# Patient Record
Sex: FEMALE | Race: WHITE | NOT HISPANIC OR LATINO | Employment: OTHER | URBAN - METROPOLITAN AREA
[De-identification: names, ages, dates, MRNs, and addresses within clinical notes are randomized per-mention and may not be internally consistent; named-entity substitution may affect disease eponyms.]

---

## 2023-05-09 ENCOUNTER — TELEPHONE (OUTPATIENT)
Dept: PRIMARY CARE | Facility: CLINIC | Age: 83
End: 2023-05-09
Payer: MEDICARE

## 2023-05-09 DIAGNOSIS — K21.9 GASTROESOPHAGEAL REFLUX DISEASE, UNSPECIFIED WHETHER ESOPHAGITIS PRESENT: ICD-10-CM

## 2023-05-09 RX ORDER — OMEPRAZOLE 20 MG/1
20 CAPSULE, DELAYED RELEASE ORAL
Qty: 90 CAPSULE | Refills: 3 | Status: SHIPPED | OUTPATIENT
Start: 2023-05-09 | End: 2024-05-21 | Stop reason: SDUPTHER

## 2023-05-09 RX ORDER — OMEPRAZOLE 20 MG/1
CAPSULE, DELAYED RELEASE ORAL
COMMUNITY
End: 2023-05-09 | Stop reason: SDUPTHER

## 2023-05-09 NOTE — TELEPHONE ENCOUNTER
Rx Refill Request Telephone Encounter    Name:  Olga Denis  :  [id number]  Medication Name:  Omeprazole 20MG Oral Capsule Delayed Release     Specific Pharmacy location:  [address]   Date of last appointment:  1/10/23  Date of next appointment: 23   Best number to reach patient: [phone]

## 2023-05-16 ENCOUNTER — TELEPHONE (OUTPATIENT)
Dept: PRIMARY CARE | Facility: CLINIC | Age: 83
End: 2023-05-16
Payer: MEDICARE

## 2023-05-16 DIAGNOSIS — E78.2 MIXED HYPERLIPIDEMIA: Primary | ICD-10-CM

## 2023-05-16 RX ORDER — ATORVASTATIN CALCIUM 40 MG/1
40 TABLET, FILM COATED ORAL
COMMUNITY
Start: 2021-12-22 | End: 2023-05-16 | Stop reason: SDUPTHER

## 2023-05-16 RX ORDER — ATORVASTATIN CALCIUM 40 MG/1
40 TABLET, FILM COATED ORAL
Qty: 45 TABLET | Refills: 3 | Status: SHIPPED | OUTPATIENT
Start: 2023-05-16 | End: 2023-07-11 | Stop reason: SDUPTHER

## 2023-05-16 NOTE — TELEPHONE ENCOUNTER
Rx Refill Request Telephone Encounter    Name:  Olga Denis  :  [id number]  Medication Name:        Atorvastatin Calcium 40 MG Oral Tablet 1 .5 tablet daily                  Specific Pharmacy location:  [facility] in [place]    Date of last appointment:  01/10/2023  Date of next appointment:  2023  Best number to reach patient:  [phone]

## 2023-07-10 PROBLEM — B35.1 TOENAIL FUNGUS: Status: ACTIVE | Noted: 2023-07-10

## 2023-07-10 PROBLEM — N95.2 VAGINAL ATROPHY: Status: ACTIVE | Noted: 2023-07-10

## 2023-07-10 PROBLEM — E04.2 MULTINODULAR GOITER: Status: ACTIVE | Noted: 2023-07-10

## 2023-07-10 PROBLEM — B35.1 ONYCHOMYCOSIS: Status: ACTIVE | Noted: 2023-07-10

## 2023-07-10 PROBLEM — M79.89 SWELLING OF RIGHT FOOT: Status: ACTIVE | Noted: 2023-07-10

## 2023-07-10 PROBLEM — M79.89 LEG SWELLING: Status: ACTIVE | Noted: 2023-07-10

## 2023-07-10 PROBLEM — Z85.820 HISTORY OF MELANOMA: Status: ACTIVE | Noted: 2023-07-10

## 2023-07-10 PROBLEM — G89.29 CHRONIC RIGHT SHOULDER PAIN: Status: ACTIVE | Noted: 2023-07-10

## 2023-07-10 PROBLEM — G43.109 OCULAR MIGRAINE: Status: ACTIVE | Noted: 2023-07-10

## 2023-07-10 PROBLEM — E11.65 TYPE 2 DIABETES MELLITUS WITH HYPERGLYCEMIA, WITHOUT LONG-TERM CURRENT USE OF INSULIN (MULTI): Status: ACTIVE | Noted: 2023-07-10

## 2023-07-10 PROBLEM — M25.511 CHRONIC RIGHT SHOULDER PAIN: Status: ACTIVE | Noted: 2023-07-10

## 2023-07-10 PROBLEM — R93.1 AGATSTON CAC SCORE, <100: Status: ACTIVE | Noted: 2023-07-10

## 2023-07-10 PROBLEM — I10 ESSENTIAL HYPERTENSION: Status: ACTIVE | Noted: 2023-07-10

## 2023-07-10 PROBLEM — J30.9 ALLERGIC RHINITIS: Status: ACTIVE | Noted: 2023-07-10

## 2023-07-10 PROBLEM — M85.80 OSTEOPENIA: Status: ACTIVE | Noted: 2023-07-10

## 2023-07-10 LAB
ALANINE AMINOTRANSFERASE (SGPT) (U/L) IN SER/PLAS: 17 U/L (ref 7–45)
ALBUMIN (G/DL) IN SER/PLAS: 3.8 G/DL (ref 3.4–5)
ALKALINE PHOSPHATASE (U/L) IN SER/PLAS: 56 U/L (ref 33–136)
ANION GAP IN SER/PLAS: 11 MMOL/L (ref 10–20)
ASPARTATE AMINOTRANSFERASE (SGOT) (U/L) IN SER/PLAS: 15 U/L (ref 9–39)
BILIRUBIN TOTAL (MG/DL) IN SER/PLAS: 0.6 MG/DL (ref 0–1.2)
CALCIDIOL (25 OH VITAMIN D3) (NG/ML) IN SER/PLAS: 45 NG/ML
CALCIUM (MG/DL) IN SER/PLAS: 9.5 MG/DL (ref 8.6–10.3)
CARBON DIOXIDE, TOTAL (MMOL/L) IN SER/PLAS: 30 MMOL/L (ref 21–32)
CHLORIDE (MMOL/L) IN SER/PLAS: 104 MMOL/L (ref 98–107)
CHOLESTEROL (MG/DL) IN SER/PLAS: 147 MG/DL (ref 0–199)
CHOLESTEROL IN HDL (MG/DL) IN SER/PLAS: 45.1 MG/DL
CHOLESTEROL IN LDL (MG/DL) IN SER/PLAS BY DIRECT ASSAY: 83 MG/DL (ref 0–129)
CHOLESTEROL/HDL RATIO: 3.3
COBALAMIN (VITAMIN B12) (PG/ML) IN SER/PLAS: 782 PG/ML (ref 211–911)
CREATININE (MG/DL) IN SER/PLAS: 0.78 MG/DL (ref 0.5–1.05)
ERYTHROCYTE DISTRIBUTION WIDTH (RATIO) BY AUTOMATED COUNT: 13.3 % (ref 11.5–14.5)
ERYTHROCYTE MEAN CORPUSCULAR HEMOGLOBIN CONCENTRATION (G/DL) BY AUTOMATED: 32.8 G/DL (ref 32–36)
ERYTHROCYTE MEAN CORPUSCULAR VOLUME (FL) BY AUTOMATED COUNT: 91 FL (ref 80–100)
ERYTHROCYTES (10*6/UL) IN BLOOD BY AUTOMATED COUNT: 4.54 X10E12/L (ref 4–5.2)
ESTIMATED AVERAGE GLUCOSE FOR HBA1C: 148 MG/DL
GFR FEMALE: 76 ML/MIN/1.73M2
GLUCOSE (MG/DL) IN SER/PLAS: 129 MG/DL (ref 74–99)
HEMATOCRIT (%) IN BLOOD BY AUTOMATED COUNT: 41.5 % (ref 36–46)
HEMOGLOBIN (G/DL) IN BLOOD: 13.6 G/DL (ref 12–16)
HEMOGLOBIN A1C/HEMOGLOBIN TOTAL IN BLOOD: 6.8 %
LDL: 77 MG/DL (ref 0–99)
LEUKOCYTES (10*3/UL) IN BLOOD BY AUTOMATED COUNT: 7.5 X10E9/L (ref 4.4–11.3)
PLATELETS (10*3/UL) IN BLOOD AUTOMATED COUNT: 247 X10E9/L (ref 150–450)
POTASSIUM (MMOL/L) IN SER/PLAS: 4.4 MMOL/L (ref 3.5–5.3)
PROTEIN TOTAL: 5.6 G/DL (ref 6.4–8.2)
SODIUM (MMOL/L) IN SER/PLAS: 141 MMOL/L (ref 136–145)
THYROTROPIN (MIU/L) IN SER/PLAS BY DETECTION LIMIT <= 0.05 MIU/L: 1.2 MIU/L (ref 0.44–3.98)
TRIGLYCERIDE (MG/DL) IN SER/PLAS: 125 MG/DL (ref 0–149)
UREA NITROGEN (MG/DL) IN SER/PLAS: 20 MG/DL (ref 6–23)
VLDL: 25 MG/DL (ref 0–40)

## 2023-07-10 RX ORDER — METOPROLOL SUCCINATE 50 MG/1
1 TABLET, EXTENDED RELEASE ORAL DAILY
COMMUNITY
End: 2023-09-01 | Stop reason: SDUPTHER

## 2023-07-10 RX ORDER — METFORMIN HYDROCHLORIDE 500 MG/1
1 TABLET ORAL 2 TIMES DAILY
COMMUNITY
Start: 2021-02-11 | End: 2024-01-15 | Stop reason: SDUPTHER

## 2023-07-10 RX ORDER — HYDROCHLOROTHIAZIDE 25 MG/1
1 TABLET ORAL DAILY
COMMUNITY
End: 2024-02-02 | Stop reason: SDUPTHER

## 2023-07-10 RX ORDER — LOSARTAN POTASSIUM 100 MG/1
1 TABLET ORAL DAILY
COMMUNITY
End: 2024-03-14 | Stop reason: SDUPTHER

## 2023-07-11 ENCOUNTER — OFFICE VISIT (OUTPATIENT)
Dept: PRIMARY CARE | Facility: CLINIC | Age: 83
End: 2023-07-11
Payer: MEDICARE

## 2023-07-11 VITALS
DIASTOLIC BLOOD PRESSURE: 82 MMHG | SYSTOLIC BLOOD PRESSURE: 148 MMHG | WEIGHT: 144.4 LBS | HEART RATE: 51 BPM | TEMPERATURE: 97.5 F | BODY MASS INDEX: 24.06 KG/M2 | RESPIRATION RATE: 16 BRPM | HEIGHT: 65 IN | OXYGEN SATURATION: 96 %

## 2023-07-11 DIAGNOSIS — I10 ESSENTIAL HYPERTENSION: ICD-10-CM

## 2023-07-11 DIAGNOSIS — E78.2 MIXED HYPERLIPIDEMIA: ICD-10-CM

## 2023-07-11 DIAGNOSIS — Z00.00 ROUTINE GENERAL MEDICAL EXAMINATION AT HEALTH CARE FACILITY: Primary | ICD-10-CM

## 2023-07-11 DIAGNOSIS — E11.65 TYPE 2 DIABETES MELLITUS WITH HYPERGLYCEMIA, WITHOUT LONG-TERM CURRENT USE OF INSULIN (MULTI): ICD-10-CM

## 2023-07-11 PROCEDURE — 1159F MED LIST DOCD IN RCRD: CPT | Performed by: NURSE PRACTITIONER

## 2023-07-11 PROCEDURE — G0439 PPPS, SUBSEQ VISIT: HCPCS | Performed by: NURSE PRACTITIONER

## 2023-07-11 PROCEDURE — 90471 IMMUNIZATION ADMIN: CPT | Performed by: NURSE PRACTITIONER

## 2023-07-11 PROCEDURE — 3077F SYST BP >= 140 MM HG: CPT | Performed by: NURSE PRACTITIONER

## 2023-07-11 PROCEDURE — 90715 TDAP VACCINE 7 YRS/> IM: CPT | Performed by: NURSE PRACTITIONER

## 2023-07-11 PROCEDURE — 1170F FXNL STATUS ASSESSED: CPT | Performed by: NURSE PRACTITIONER

## 2023-07-11 PROCEDURE — 3079F DIAST BP 80-89 MM HG: CPT | Performed by: NURSE PRACTITIONER

## 2023-07-11 PROCEDURE — 1036F TOBACCO NON-USER: CPT | Performed by: NURSE PRACTITIONER

## 2023-07-11 PROCEDURE — 1126F AMNT PAIN NOTED NONE PRSNT: CPT | Performed by: NURSE PRACTITIONER

## 2023-07-11 RX ORDER — ATORVASTATIN CALCIUM 40 MG/1
40 TABLET, FILM COATED ORAL
Qty: 90 TABLET | Refills: 1 | Status: SHIPPED | OUTPATIENT
Start: 2023-07-11 | End: 2023-09-01 | Stop reason: SDUPTHER

## 2023-07-11 ASSESSMENT — PATIENT HEALTH QUESTIONNAIRE - PHQ9
1. LITTLE INTEREST OR PLEASURE IN DOING THINGS: NOT AT ALL
2. FEELING DOWN, DEPRESSED OR HOPELESS: NOT AT ALL
SUM OF ALL RESPONSES TO PHQ9 QUESTIONS 1 AND 2: 0

## 2023-07-11 ASSESSMENT — ENCOUNTER SYMPTOMS
CHILLS: 0
SORE THROAT: 0
NERVOUS/ANXIOUS: 0
CONFUSION: 0
COUGH: 0
FEVER: 0
OCCASIONAL FEELINGS OF UNSTEADINESS: 0
VOMITING: 0
PALPITATIONS: 0
WEAKNESS: 0
LOSS OF SENSATION IN FEET: 1
MYALGIAS: 0
HEADACHES: 0
SLEEP DISTURBANCE: 0
SHORTNESS OF BREATH: 0
ABDOMINAL PAIN: 0
ACTIVITY CHANGE: 0
CONSTITUTIONAL NEGATIVE: 1
DIZZINESS: 0
SPEECH DIFFICULTY: 0
NAUSEA: 0
APNEA: 0
ARTHRALGIAS: 0
DEPRESSION: 0

## 2023-07-11 ASSESSMENT — ACTIVITIES OF DAILY LIVING (ADL)
BATHING: INDEPENDENT
GROCERY_SHOPPING: INDEPENDENT
DOING_HOUSEWORK: INDEPENDENT
DRESSING: INDEPENDENT
MANAGING_FINANCES: INDEPENDENT
TAKING_MEDICATION: INDEPENDENT

## 2023-07-11 NOTE — PROGRESS NOTES
"Subjective   Reason for Visit: Olga Denis is an 82 y.o. female here for a Medicare Wellness visit.     Past Medical, Surgical, and Family History reviewed and updated in chart.    Reviewed all medications by prescribing practitioner or clinical pharmacist (such as prescriptions, OTCs, herbal therapies and supplements) and documented in the medical record.    Lab review     .Lab Results       Component                Value               Date                       CHOL                     147                 07/10/2023                 CHOL                     130                 01/04/2023                 CHOL                     120                 05/20/2022            Lab Results       Component                Value               Date                       HDL                      45.1                07/10/2023                 HDL                      41.2                01/04/2023                 HDL                      40.7                05/20/2022            No results found for: \"LDLCALC\"  Lab Results       Component                Value               Date                       TRIG                     125                 07/10/2023                 TRIG                     99                  01/04/2023                 TRIG                     81                  05/20/2022            No components found for: \"CHOLHDL\"     .  Chemistry   Lab Results       Component                Value               Date/Time                  NA                       141                 07/10/2023 0711            K                        4.4                 07/10/2023 0711            CL                       104                 07/10/2023 0711            CO2                      30                  07/10/2023 0711            BUN                      20                  07/10/2023 0711            CREATININE               0.78                07/10/2023 0711      Lab Results       Component                Value        " "       Date/Time                  CALCIUM                  9.5                 07/10/2023 0711            ALKPHOS                  56                  07/10/2023 0711            AST                      15                  07/10/2023 0711            ALT                      17                  07/10/2023 0711            BILITOT                  0.6                 07/10/2023 0711          .Lab Results       Component                Value               Date                       HGBA1C                   6.8 (A)             07/10/2023                   Patient Care Team:  Mamie Mane MD as PCP - General  LISANDRO Barrientos as PCP - [facility]     Review of Systems   Constitutional: Negative.  Negative for activity change, chills and fever.   HENT:  Negative for congestion, postnasal drip, sneezing and sore throat.    Respiratory:  Negative for apnea, cough and shortness of breath.    Cardiovascular:  Negative for chest pain and palpitations.   Gastrointestinal:  Negative for abdominal pain, nausea and vomiting.   Musculoskeletal:  Negative for arthralgias and myalgias.   Neurological:  Negative for dizziness, syncope, speech difficulty, weakness and headaches.   Psychiatric/Behavioral:  Negative for confusion and sleep disturbance. The patient is not nervous/anxious.        Objective   Vitals:  /82   Pulse 51   Temp 36.4 °C (97.5 °F)   Resp 16   Ht 1.651 m (5' 5\")   Wt 65.5 kg (144 lb 6.4 oz)   SpO2 96%   BMI 24.03 kg/m²       Physical Exam  Vitals reviewed.   Constitutional:       Appearance: Normal appearance.   HENT:      Head: Normocephalic.   Eyes:      Conjunctiva/sclera: Conjunctivae normal.   Cardiovascular:      Rate and Rhythm: Normal rate and regular rhythm.      Pulses: Normal pulses.      Heart sounds: Normal heart sounds.   Pulmonary:      Effort: Pulmonary effort is normal.      Breath sounds: Normal breath sounds. No wheezing.   Abdominal:      General: Bowel " sounds are normal. There is no distension.   Skin:     General: Skin is warm and dry.   Neurological:      General: No focal deficit present.      Mental Status: She is alert and oriented to person, place, and time.   Psychiatric:         Mood and Affect: Mood normal.         Behavior: Behavior normal.         Assessment/Plan   Problem List Items Addressed This Visit       Mixed hyperlipidemia    Current Assessment & Plan     LDL at goal   Follow up 6 months with labs         Relevant Medications    atorvastatin (Lipitor) 40 mg tablet    Other Relevant Orders    CBC    Comprehensive Metabolic Panel    Hemoglobin A1C    Lipid Panel    TSH with reflex to Free T4 if abnormal    Vitamin D 1,25 Dihydroxy    Vitamin B12    Essential hypertension    Relevant Orders    CBC    Comprehensive Metabolic Panel    Hemoglobin A1C    Lipid Panel    TSH with reflex to Free T4 if abnormal    Vitamin D 1,25 Dihydroxy    Vitamin B12    Type 2 diabetes mellitus with hyperglycemia, without long-term current use of insulin (CMS/[facility])    Current Assessment & Plan     .  Lab Results   Component Value Date    HGBA1C 6.8 (A) 07/10/2023    Well controlled, continue current poc   Follow up 6 months          Relevant Orders    CBC    Comprehensive Metabolic Panel    Hemoglobin A1C    Lipid Panel    TSH with reflex to Free T4 if abnormal    Vitamin D 1,25 Dihydroxy    Vitamin B12    Routine general medical examination at health care facility - Primary    Relevant Orders    CBC    Comprehensive Metabolic Panel    Hemoglobin A1C    Lipid Panel    TSH with reflex to Free T4 if abnormal    Vitamin D 1,25 Dihydroxy    Vitamin B12

## 2023-07-11 NOTE — ASSESSMENT & PLAN NOTE
.  Lab Results   Component Value Date    HGBA1C 6.8 (A) 07/10/2023    Well controlled, continue current poc   Follow up 6 months

## 2023-09-01 ENCOUNTER — OFFICE VISIT (OUTPATIENT)
Dept: PRIMARY CARE | Facility: CLINIC | Age: 83
End: 2023-09-01
Payer: MEDICARE

## 2023-09-01 VITALS
OXYGEN SATURATION: 98 % | RESPIRATION RATE: 16 BRPM | HEART RATE: 54 BPM | SYSTOLIC BLOOD PRESSURE: 146 MMHG | DIASTOLIC BLOOD PRESSURE: 76 MMHG | TEMPERATURE: 97.6 F | HEIGHT: 65 IN | WEIGHT: 147.2 LBS | BODY MASS INDEX: 24.53 KG/M2

## 2023-09-01 DIAGNOSIS — R00.1 SINUS BRADYCARDIA: ICD-10-CM

## 2023-09-01 DIAGNOSIS — E78.2 MIXED HYPERLIPIDEMIA: ICD-10-CM

## 2023-09-01 DIAGNOSIS — I10 ESSENTIAL HYPERTENSION: Primary | ICD-10-CM

## 2023-09-01 DIAGNOSIS — R07.9 CHEST PAIN, UNSPECIFIED TYPE: ICD-10-CM

## 2023-09-01 PROCEDURE — 1159F MED LIST DOCD IN RCRD: CPT | Performed by: NURSE PRACTITIONER

## 2023-09-01 PROCEDURE — 1126F AMNT PAIN NOTED NONE PRSNT: CPT | Performed by: NURSE PRACTITIONER

## 2023-09-01 PROCEDURE — 99214 OFFICE O/P EST MOD 30 MIN: CPT | Performed by: NURSE PRACTITIONER

## 2023-09-01 PROCEDURE — 3077F SYST BP >= 140 MM HG: CPT | Performed by: NURSE PRACTITIONER

## 2023-09-01 PROCEDURE — 93000 ELECTROCARDIOGRAM COMPLETE: CPT | Performed by: NURSE PRACTITIONER

## 2023-09-01 PROCEDURE — 1036F TOBACCO NON-USER: CPT | Performed by: NURSE PRACTITIONER

## 2023-09-01 PROCEDURE — 3078F DIAST BP <80 MM HG: CPT | Performed by: NURSE PRACTITIONER

## 2023-09-01 RX ORDER — METOPROLOL SUCCINATE 25 MG/1
50 TABLET, EXTENDED RELEASE ORAL DAILY
Qty: 180 TABLET | Refills: 1 | Status: SHIPPED | OUTPATIENT
Start: 2023-09-01 | End: 2023-10-10 | Stop reason: SDUPTHER

## 2023-09-01 RX ORDER — ATORVASTATIN CALCIUM 40 MG/1
60 TABLET, FILM COATED ORAL
Qty: 90 TABLET | Refills: 1 | Status: SHIPPED | OUTPATIENT
Start: 2023-09-01 | End: 2024-01-29 | Stop reason: SDUPTHER

## 2023-09-01 ASSESSMENT — ENCOUNTER SYMPTOMS
NAUSEA: 0
ACTIVITY CHANGE: 0
WEAKNESS: 0
NERVOUS/ANXIOUS: 0
MYALGIAS: 0
FEVER: 0
SORE THROAT: 0
COUGH: 0
DIZZINESS: 0
PALPITATIONS: 0
ABDOMINAL PAIN: 0
CHILLS: 0
ARTHRALGIAS: 0
SPEECH DIFFICULTY: 0
APNEA: 0
CONSTITUTIONAL NEGATIVE: 1
CONFUSION: 0
VOMITING: 0
SHORTNESS OF BREATH: 0
HEADACHES: 0
HYPERTENSION: 1
SLEEP DISTURBANCE: 0

## 2023-09-01 NOTE — ASSESSMENT & PLAN NOTE
"Continue current plan of care, atorvastatin at 60 mg daily.  Corrected in patient's records to reflect correct dose.  Patient has consistently taken 1-1/2 tabs to equal 60 mg but apparently the recording of this was incorrect at 40 mg daily.  Continue low saturated and low trans fat diet  Lab Results   Component Value Date    CHOL 147 07/10/2023    CHOL 130 01/04/2023    CHOL 120 05/20/2022     Lab Results   Component Value Date    HDL 45.1 07/10/2023    HDL 41.2 01/04/2023    HDL 40.7 05/20/2022     No results found for: \"LDLCALC\"  Lab Results   Component Value Date    TRIG 125 07/10/2023    TRIG 99 01/04/2023    TRIG 81 05/20/2022     No components found for: \"CHOLHDL\"   "

## 2023-09-01 NOTE — ASSESSMENT & PLAN NOTE
Decrease metoprolol to 25 mg daily  Cardiac stress test due to risk factors  Follow-up 1 month  Call for worsening or symptoms.  ED for severe

## 2023-09-01 NOTE — PROGRESS NOTES
Subjective   Patient ID: Olga Denis is a 82 y.o. female who presents for Hypertension.    Hypertension: Patient seen on 7/11/2023. At that time BP was elevated 148/82  Patient on losartan, hydrochlorothiazide and metoprolol    Chest Pain: Midline, for about 1 month  Denies sob, palpitations   Patient with risk factors of HTN, DM and age 82.   She thinks the discomfort is a hiatal hernia however.       Last Calcium Scoring 2021,    FINDINGS:  The score and distribution of calcium in the coronary arteries is as  follows:     LM 29  LAD 2  LCx 2  RCA 53     Total 86     The visualized mid/lower ascending thoracic aorta measures 3.8 cm in  diameter. The heart is normal in size. No pericardial effusion is  present.     No gross evidence of mediastinal or hilar lymphadenopathy or masses  is identified. The visualized segments of the lungs are  hyperinflated.  An area of nodularity in the right middle lobe may  represent an area of scarring measuring 9 mm. Consider short-term  follow-up.     The visualized subdiaphragmatic structures appear intact.  FINDINGS:  The score and distribution of calcium in the coronary arteries is as  follows:     LM 29  LAD 2  LCx 2  RCA 53     Total 86     The visualized mid/lower ascending thoracic aorta measures 3.8 cm in  diameter. The heart is normal in size. No pericardial effusion is  present.     No gross evidence of mediastinal or hilar lymphadenopathy or masses  is identified. The visualized segments of the lungs are  hyperinflated.  An area of nodularity in the right middle lobe may  represent an area of scarring measuring 9 mm. Consider short-term  follow-up.     The visualized subdiaphragmatic structures appear intact.      Hypertension  Associated symptoms include chest pain. Pertinent negatives include no headaches, palpitations or shortness of breath.        Review of Systems   Constitutional: Negative.  Negative for activity change, chills and fever.   HENT:   "Negative for congestion, postnasal drip, sneezing and sore throat.    Respiratory:  Negative for apnea, cough and shortness of breath.    Cardiovascular:  Positive for chest pain. Negative for palpitations.   Gastrointestinal:  Negative for abdominal pain, nausea and vomiting.   Musculoskeletal:  Negative for arthralgias and myalgias.   Neurological:  Negative for dizziness, syncope, speech difficulty, weakness and headaches.   Psychiatric/Behavioral:  Negative for confusion and sleep disturbance. The patient is not nervous/anxious.        Objective   /76   Pulse 54   Temp 36.4 °C (97.6 °F) (Temporal)   Resp 16   Ht 1.651 m (5' 5\")   Wt 66.8 kg (147 lb 3.2 oz)   SpO2 98%   BMI 24.50 kg/m²     Physical Exam  Vitals reviewed.   Constitutional:       Appearance: Normal appearance.   HENT:      Head: Normocephalic.   Eyes:      Conjunctiva/sclera: Conjunctivae normal.   Cardiovascular:      Rate and Rhythm: Normal rate and regular rhythm.      Pulses: Normal pulses.      Heart sounds: Normal heart sounds.   Pulmonary:      Effort: Pulmonary effort is normal.      Breath sounds: Normal breath sounds. No wheezing.   Abdominal:      General: Bowel sounds are normal. There is no distension.   Skin:     General: Skin is warm and dry.   Neurological:      General: No focal deficit present.      Mental Status: She is alert and oriented to person, place, and time.   Psychiatric:         Mood and Affect: Mood normal.         Behavior: Behavior normal.         Assessment/Plan   Problem List Items Addressed This Visit       Mixed hyperlipidemia     Continue current plan of care, atorvastatin at 60 mg daily.  Corrected in patient's records to reflect correct dose.  Patient has consistently taken 1-1/2 tabs to equal 60 mg but apparently the recording of this was incorrect at 40 mg daily.  Continue low saturated and low trans fat diet  Lab Results   Component Value Date    CHOL 147 07/10/2023    CHOL 130 01/04/2023    " "CHOL 120 05/20/2022     Lab Results   Component Value Date    HDL 45.1 07/10/2023    HDL 41.2 01/04/2023    HDL 40.7 05/20/2022   No results found for: \"LDLCALC\"  Lab Results   Component Value Date    TRIG 125 07/10/2023    TRIG 99 01/04/2023    TRIG 81 05/20/2022   No components found for: \"CHOLHDL\"          Relevant Medications    atorvastatin (Lipitor) 40 mg tablet    Essential hypertension - Primary     Decrease metoprolol to 25 mg once daily  Continue losartan 100 mg daily and hydrochlorothiazide 25 mg daily  Check BP daily and call for consistent elevation greater than 150/90  No added salt diet         Chest pain    Relevant Medications    metoprolol succinate XL (Toprol-XL) 25 mg 24 hr tablet    Other Relevant Orders    ECG 12 lead (Clinic Performed) (Completed)    Stress Test Only    Sinus bradycardia     Decrease metoprolol to 25 mg daily  Cardiac stress test due to risk factors  Follow-up 1 month  Call for worsening or symptoms.  ED for severe         Relevant Medications    metoprolol succinate XL (Toprol-XL) 25 mg 24 hr tablet       Time Spent  Time spent directly with patient, family or caregiver: 20 minutes  Other Time Spent: 10 minutes      "

## 2023-09-01 NOTE — ASSESSMENT & PLAN NOTE
Decrease metoprolol to 25 mg once daily  Continue losartan 100 mg daily and hydrochlorothiazide 25 mg daily  Check BP daily and call for consistent elevation greater than 150/90  No added salt diet

## 2023-09-19 ENCOUNTER — TELEPHONE (OUTPATIENT)
Dept: PRIMARY CARE | Facility: CLINIC | Age: 83
End: 2023-09-19
Payer: MEDICARE

## 2023-09-19 DIAGNOSIS — I49.3 PVC (PREMATURE VENTRICULAR CONTRACTION): ICD-10-CM

## 2023-09-19 NOTE — TELEPHONE ENCOUNTER
Received email from [facility] regarding the patient's stress test results. Pending the recommended Holter monitor per Dr. Tobias's recommendations.

## 2023-09-21 NOTE — TELEPHONE ENCOUNTER
Left voicemail with patient to call back to discuss results. I will give the patient the information needed in order to  her Holter.

## 2023-09-25 ENCOUNTER — TELEPHONE (OUTPATIENT)
Dept: PRIMARY CARE | Facility: CLINIC | Age: 83
End: 2023-09-25
Payer: MEDICARE

## 2023-09-25 NOTE — TELEPHONE ENCOUNTER
Patient was not able to get an appt for the holter monitor test until the 2nd of October, would you like her to reschedule her appt on the 3rd? Please advise.

## 2023-10-02 ENCOUNTER — HOSPITAL ENCOUNTER (OUTPATIENT)
Dept: CARDIOLOGY | Facility: HOSPITAL | Age: 83
Discharge: HOME | End: 2023-10-02
Payer: MEDICARE

## 2023-10-02 DIAGNOSIS — I49.3 PVC (PREMATURE VENTRICULAR CONTRACTION): ICD-10-CM

## 2023-10-02 PROCEDURE — 93242 EXT ECG>48HR<7D RECORDING: CPT

## 2023-10-03 ENCOUNTER — APPOINTMENT (OUTPATIENT)
Dept: PRIMARY CARE | Facility: CLINIC | Age: 83
End: 2023-10-03
Payer: MEDICARE

## 2023-10-10 ENCOUNTER — OFFICE VISIT (OUTPATIENT)
Dept: PRIMARY CARE | Facility: CLINIC | Age: 83
End: 2023-10-10
Payer: MEDICARE

## 2023-10-10 VITALS
HEART RATE: 55 BPM | OXYGEN SATURATION: 98 % | HEIGHT: 65 IN | SYSTOLIC BLOOD PRESSURE: 160 MMHG | WEIGHT: 147.2 LBS | RESPIRATION RATE: 16 BRPM | DIASTOLIC BLOOD PRESSURE: 86 MMHG | BODY MASS INDEX: 24.53 KG/M2 | TEMPERATURE: 97 F

## 2023-10-10 DIAGNOSIS — E78.2 MIXED HYPERLIPIDEMIA: ICD-10-CM

## 2023-10-10 DIAGNOSIS — E11.65 TYPE 2 DIABETES MELLITUS WITH HYPERGLYCEMIA, WITHOUT LONG-TERM CURRENT USE OF INSULIN (MULTI): ICD-10-CM

## 2023-10-10 DIAGNOSIS — R07.9 CHEST PAIN, UNSPECIFIED TYPE: ICD-10-CM

## 2023-10-10 DIAGNOSIS — I49.3 PVC (PREMATURE VENTRICULAR CONTRACTION): ICD-10-CM

## 2023-10-10 DIAGNOSIS — I10 ESSENTIAL HYPERTENSION: ICD-10-CM

## 2023-10-10 DIAGNOSIS — R00.1 SINUS BRADYCARDIA: Primary | ICD-10-CM

## 2023-10-10 PROCEDURE — 1159F MED LIST DOCD IN RCRD: CPT | Performed by: NURSE PRACTITIONER

## 2023-10-10 PROCEDURE — 99213 OFFICE O/P EST LOW 20 MIN: CPT | Performed by: NURSE PRACTITIONER

## 2023-10-10 PROCEDURE — 3079F DIAST BP 80-89 MM HG: CPT | Performed by: NURSE PRACTITIONER

## 2023-10-10 PROCEDURE — 3077F SYST BP >= 140 MM HG: CPT | Performed by: NURSE PRACTITIONER

## 2023-10-10 PROCEDURE — 1036F TOBACCO NON-USER: CPT | Performed by: NURSE PRACTITIONER

## 2023-10-10 PROCEDURE — 1126F AMNT PAIN NOTED NONE PRSNT: CPT | Performed by: NURSE PRACTITIONER

## 2023-10-10 RX ORDER — METOPROLOL SUCCINATE 25 MG/1
25 TABLET, EXTENDED RELEASE ORAL DAILY
Qty: 90 TABLET | Refills: 1 | Status: SHIPPED
Start: 2023-10-10 | End: 2024-01-02 | Stop reason: SDUPTHER

## 2023-10-10 ASSESSMENT — ENCOUNTER SYMPTOMS
PALPITATIONS: 0
MYALGIAS: 0
ARTHRALGIAS: 0
ABDOMINAL PAIN: 0
HEADACHES: 0
COUGH: 0
SLEEP DISTURBANCE: 0
CHILLS: 0
WEAKNESS: 0
SPEECH DIFFICULTY: 0
APNEA: 0
CONFUSION: 0
VOMITING: 0
SORE THROAT: 0
NAUSEA: 0
CONSTITUTIONAL NEGATIVE: 1
ACTIVITY CHANGE: 0
SHORTNESS OF BREATH: 0
FEVER: 0
DIZZINESS: 0
NERVOUS/ANXIOUS: 0

## 2023-10-10 NOTE — ASSESSMENT & PLAN NOTE
Keep bp log, check daily   Follow up 4-6 months   Continue hydrochlorothiazide, metoprolol   Albumin, urine with next labs

## 2023-10-10 NOTE — PROGRESS NOTES
"Subjective   Patient ID: Olga Denis is a 82 y.o. female who presents for Follow-up (1 month follow up stress test and holter monitor ).    Follow-up stress test results  1.  No definite ST segment changes or symptoms at peak exercise  2.  There were frequent PVCs, including couplets during exercise and frequent PVCs/ventricular bigeminy in recovery  3.  Cardiologist will consider repeat stress test with imaging component if clinically indicated particularly in the light of the patient's advanced age and risk factors for coronary artery disease such as hypertension diabetes mellitus and dyslipidemia  4.  Consider Holter monitor-patient did complete Holter monitor for a few days and returned on yesterday.  Pending results  5.  Adequate level of stress achieved on stress test  Scheduled appointment with cardiology to consider repeating stress test with imaging component    Sinus bradycardia: Decrease metoprolol to 25 mg daily.  Asymptomatic however  Schedule appointment with cardiology      Hypertension: Continued elevation systolically.  Encourage patient to monitor daily with blood pressure log and bring to next visit         Review of Systems   Constitutional: Negative.  Negative for activity change, chills and fever.   HENT:  Negative for congestion, postnasal drip, sneezing and sore throat.    Respiratory:  Negative for apnea, cough and shortness of breath.    Cardiovascular:  Negative for chest pain and palpitations.   Gastrointestinal:  Negative for abdominal pain, nausea and vomiting.   Musculoskeletal:  Negative for arthralgias and myalgias.   Neurological:  Negative for dizziness, syncope, speech difficulty, weakness and headaches.   Psychiatric/Behavioral:  Negative for confusion and sleep disturbance. The patient is not nervous/anxious.        Objective   /86   Pulse 55   Temp 36.1 °C (97 °F) (Temporal)   Resp 16   Ht 1.651 m (5' 5\")   Wt 66.8 kg (147 lb 3.2 oz)   SpO2 98%   BMI " 24.50 kg/m²     Physical Exam  Vitals reviewed.   Constitutional:       Appearance: Normal appearance.   Cardiovascular:      Rate and Rhythm: Normal rate and regular rhythm.      Pulses: Normal pulses.      Heart sounds: Normal heart sounds.   Pulmonary:      Effort: Pulmonary effort is normal.      Breath sounds: Normal breath sounds.   Neurological:      Mental Status: She is alert and oriented to person, place, and time.   Psychiatric:         Mood and Affect: Mood normal.         Behavior: Behavior normal.         Assessment/Plan   Problem List Items Addressed This Visit             ICD-10-CM    Mixed hyperlipidemia E78.2     On statin   Low saturated fats   Recheck fasting panel in 4-6 months          Essential hypertension I10     Keep bp log, check daily   Follow up 4-6 months   Continue hydrochlorothiazide, metoprolol   Albumin, urine with next labs          Relevant Orders    Albumin , Urine Random    Type 2 diabetes mellitus with hyperglycemia, without long-term current use of insulin (CMS/[facility]) E11.65     Continue current poc   Follow up with labs 4-6 months   .  Lab Results   Component Value Date    HGBA1C 6.8 (A) 07/10/2023             Chest pain R07.9    Relevant Medications    metoprolol succinate XL (Toprol-XL) 25 mg 24 hr tablet    Sinus bradycardia - Primary R00.1     Recent decrease in metoprolol   Holter results pending   Scheduled with cardiology   asymptomatic         Relevant Medications    metoprolol succinate XL (Toprol-XL) 25 mg 24 hr tablet    PVC (premature ventricular contraction) I49.3     Holter results pending   Stress related ??  Limit caffeine intake            Relevant Medications    metoprolol succinate XL (Toprol-XL) 25 mg 24 hr tablet

## 2023-10-10 NOTE — ASSESSMENT & PLAN NOTE
Continue current poc   Follow up with labs 4-6 months   .  Lab Results   Component Value Date    HGBA1C 6.8 (A) 07/10/2023

## 2023-11-04 PROBLEM — E11.9 DIET-CONTROLLED TYPE 2 DIABETES MELLITUS (MULTI): Status: ACTIVE | Noted: 2023-11-04

## 2023-11-04 PROBLEM — R05.9 COUGH: Status: ACTIVE | Noted: 2023-11-04

## 2023-11-04 PROBLEM — M79.601 CHRONIC PAIN OF RIGHT UPPER EXTREMITY: Status: ACTIVE | Noted: 2023-07-10

## 2023-11-04 PROBLEM — E78.2 MIXED HYPERLIPIDEMIA: Status: ACTIVE | Noted: 2022-06-08

## 2023-11-04 PROBLEM — M25.569 KNEE PAIN: Status: ACTIVE | Noted: 2023-03-07

## 2023-11-04 PROBLEM — R51.9 HEADACHE: Status: ACTIVE | Noted: 2023-11-04

## 2023-11-04 PROBLEM — M17.12 OSTEOARTHRITIS OF LEFT KNEE: Status: ACTIVE | Noted: 2023-03-07

## 2023-11-04 PROBLEM — I10 PRIMARY HYPERTENSION: Status: ACTIVE | Noted: 2022-06-08

## 2023-11-04 PROBLEM — Z86.79 HISTORY OF HYPERTENSION: Status: ACTIVE | Noted: 2023-11-04

## 2023-11-04 PROBLEM — E66.3 OVERWEIGHT WITH BODY MASS INDEX (BMI) 25.0-29.9: Status: ACTIVE | Noted: 2023-11-04

## 2023-11-04 PROBLEM — Z86.39 HISTORY OF ELEVATED LIPIDS: Status: ACTIVE | Noted: 2023-11-04

## 2023-11-04 PROBLEM — B35.1 ONYCHOMYCOSIS OF TOENAIL: Status: ACTIVE | Noted: 2022-04-07

## 2023-11-04 PROBLEM — B83.9 WORMS IN STOOL: Status: ACTIVE | Noted: 2023-11-04

## 2023-11-04 PROBLEM — I73.9 PERIPHERAL ARTERIAL DISEASE (CMS-HCC): Status: ACTIVE | Noted: 2022-05-03

## 2023-11-04 PROBLEM — Z86.39 HISTORY OF DIABETES MELLITUS: Status: ACTIVE | Noted: 2023-11-04

## 2023-11-04 PROBLEM — R53.83 FATIGUE: Status: ACTIVE | Noted: 2023-11-04

## 2023-11-04 PROBLEM — E11.9 TYPE 2 DIABETES MELLITUS (MULTI): Status: ACTIVE | Noted: 2022-06-08

## 2023-11-04 PROBLEM — R09.81 NASAL CONGESTION: Status: ACTIVE | Noted: 2023-11-04

## 2023-11-04 RX ORDER — ALBUTEROL SULFATE 90 UG/1
AEROSOL, METERED RESPIRATORY (INHALATION)
COMMUNITY
Start: 2021-09-03 | End: 2023-11-07 | Stop reason: WASHOUT

## 2023-11-07 ENCOUNTER — OFFICE VISIT (OUTPATIENT)
Dept: CARDIOLOGY | Facility: CLINIC | Age: 83
End: 2023-11-07
Payer: MEDICARE

## 2023-11-07 VITALS
HEIGHT: 65 IN | BODY MASS INDEX: 24.89 KG/M2 | WEIGHT: 149.4 LBS | HEART RATE: 58 BPM | SYSTOLIC BLOOD PRESSURE: 130 MMHG | DIASTOLIC BLOOD PRESSURE: 84 MMHG

## 2023-11-07 DIAGNOSIS — R06.09 DYSPNEA ON EXERTION: ICD-10-CM

## 2023-11-07 DIAGNOSIS — R93.1 AGATSTON CAC SCORE, <100: Primary | ICD-10-CM

## 2023-11-07 DIAGNOSIS — I49.3 MULTIPLE PREMATURE VENTRICULAR COMPLEXES: ICD-10-CM

## 2023-11-07 PROCEDURE — 3079F DIAST BP 80-89 MM HG: CPT | Performed by: INTERNAL MEDICINE

## 2023-11-07 PROCEDURE — 93000 ELECTROCARDIOGRAM COMPLETE: CPT | Performed by: INTERNAL MEDICINE

## 2023-11-07 PROCEDURE — 1036F TOBACCO NON-USER: CPT | Performed by: INTERNAL MEDICINE

## 2023-11-07 PROCEDURE — 99215 OFFICE O/P EST HI 40 MIN: CPT | Performed by: INTERNAL MEDICINE

## 2023-11-07 PROCEDURE — 1159F MED LIST DOCD IN RCRD: CPT | Performed by: INTERNAL MEDICINE

## 2023-11-07 PROCEDURE — 3075F SYST BP GE 130 - 139MM HG: CPT | Performed by: INTERNAL MEDICINE

## 2023-11-07 PROCEDURE — 1126F AMNT PAIN NOTED NONE PRSNT: CPT | Performed by: INTERNAL MEDICINE

## 2023-11-07 NOTE — PROGRESS NOTES
"Referred by Dr. Tobias for No chief complaint on file.     History Of Present Illness:    Olga Denis is a 82 y.o. female presenting to establish CV care.  Has h/o PVD and sees Dr. Brett Mcdonough.  H/o HTN, DL, DM2, Bradycardia.      Denies prior h/o CAD, MI, CHF.  She had a stress test > 5 years ago and was told everything \"was fine.\"  She is physically active, does treadmill x 20 minutes daily, does floor exercises, build a deck this year, and raked leaves recently.  Does have ORTEZ with activity, no chest pain/pressure with activity.  During her last visit with Dr. Duckworth, she did c/o pain in the middle of her chest, like a \"sharp pain,\" didn't feel well.  Does have frequent episodes of leg swelling, no orthopnea, PND.    Occ dizziness with exertion, no overt syncope.    CV meds:  atorvastatin, hydrochlorothiazide, losartan, metoprolol.    ROS:  The remainder of the review of systems was obtained, as was negative as pertains to the chief complaint.    Fhx:  mother/father  young, paternal grandmother had heart failure, brother had heart valve replacement for rheumatic heart dse  SocHx:  lifetime nonsmoker, rare EtOH, no illicits    CV Studies Reviewed:      Stress Test 23:  1. No definite ST segment changes or symptoms at peak exercise (91% MPHR, 7.0 METS).  2. There were frequent PVC's, including couplets during exercise, and frequent PVC's/ventricular bigeminy in recovery.  3. Would consider repeat stress testing with imaging component if clinically indicated, particularly in light of patient's advanced age and risk factors for CAD (HTN, DL, DM).  4. Consider holter monitor to determine PVC burden.  5. Adequate level of stress achieved.    Holter 2023:    SB-ST HR max 134 bpm, 2+ PVC's, < 1% PACs, two episodes NSVT longest 3 beats, two patient triggers assoc with SR/SR with PAC    CT CAC Score 2021:  LM 29  LAD 2  LCx 2  RCA 53     Total 86    Past Medical History:  She has a past medical " "history of Other nonspecific abnormal finding of lung field (12/22/2021), Personal history of malignant melanoma of skin, Personal history of other diseases of the circulatory system, Personal history of other diseases of the musculoskeletal system and connective tissue, Personal history of other endocrine, nutritional and metabolic disease, Personal history of other endocrine, nutritional and metabolic disease, Personal history of other specified conditions, and Personal history of other specified conditions (06/25/2020).    Past Surgical History:  She has a past surgical history that includes Septoplasty (05/02/2018); Tonsillectomy (05/02/2018); Bladder surgery (05/02/2018); Hysterectomy (05/02/2018); Eye surgery (05/02/2018); and Other surgical history (06/15/2022).      Social History:  She reports that she has never smoked. She has never used smokeless tobacco. She reports that she does not drink alcohol and does not use drugs.    Family History:  Family History   Problem Relation Name Age of Onset    Breast cancer Mother      Breast cancer Mother's Sister          Allergies:  Ace inhibitors, Sulfa (sulfonamide antibiotics), Amlodipine, Meloxicam, and Olmesartan    Outpatient Medications:  Current Outpatient Medications   Medication Instructions    atorvastatin (LIPITOR) 60 mg, oral, Daily RT    hydroCHLOROthiazide (HYDRODiuril) 25 mg tablet 1 tablet, oral, Daily    losartan (Cozaar) 100 mg tablet 1 tablet, oral, Daily    metFORMIN (Glucophage) 500 mg tablet 1 tablet, oral, 2 times daily    metoprolol succinate XL (TOPROL-XL) 25 mg, oral, Daily    omeprazole (PRILOSEC) 20 mg, oral, Daily RT        Last Recorded Vitals:  Vitals:    11/07/23 1044   BP: 130/84   Pulse: 58   Weight: 67.8 kg (149 lb 6.4 oz)   Height: 1.651 m (5' 5\")       Physical Exam:  Physical Exam  Constitutional:       Appearance: Normal appearance.   HENT:      Head: Normocephalic.      Mouth/Throat:      Mouth: Mucous membranes are moist. "   Eyes:      Extraocular Movements: Extraocular movements intact.   Cardiovascular:      Rate and Rhythm: Normal rate and regular rhythm.      Comments: 1/6 systolic murmur RUSB  Pulmonary:      Effort: Pulmonary effort is normal.   Abdominal:      Palpations: Abdomen is soft.   Musculoskeletal:      Cervical back: Neck supple.   Skin:     General: Skin is warm.   Neurological:      General: No focal deficit present.      Mental Status: She is alert.   Psychiatric:         Mood and Affect: Mood normal.              Last Labs:  CBC -  Lab Results   Component Value Date    WBC 7.5 07/10/2023    HGB 13.6 07/10/2023    HCT 41.5 07/10/2023    MCV 91 07/10/2023     07/10/2023       CMP -  Lab Results   Component Value Date    CALCIUM 9.5 07/10/2023    PROT 5.6 (L) 07/10/2023    ALBUMIN 3.8 07/10/2023    AST 15 07/10/2023    ALT 17 07/10/2023    ALKPHOS 56 07/10/2023    BILITOT 0.6 07/10/2023       LIPID PANEL -   Lab Results   Component Value Date    CHOL 147 07/10/2023    TRIG 125 07/10/2023    HDL 45.1 07/10/2023    CHHDL 3.3 07/10/2023    LDLF 77 07/10/2023    VLDL 25 07/10/2023       RENAL FUNCTION PANEL -   Lab Results   Component Value Date    GLUCOSE 129 (H) 07/10/2023     07/10/2023    K 4.4 07/10/2023     07/10/2023    CO2 30 07/10/2023    ANIONGAP 11 07/10/2023    BUN 20 07/10/2023    CREATININE 0.78 07/10/2023    CALCIUM 9.5 07/10/2023    ALBUMIN 3.8 07/10/2023        Lab Results   Component Value Date    HGBA1C 6.8 (A) 07/10/2023       Last Cardiology Tests:  ECG:  ECG 12 Lead 11/7/2023    Cardiac Imaging:  CT HEART CALCIUM SCORING WO IV CONTRAST 2/13/2023      Assessment/Plan   ORTEZ, atypical chest pain:  had treadmill stress test which demonstrated no significant EKG changes, but did demonstrATE frequent PVC's and couplets.  EKG today with inferior/anterior Q waves  -check treadmill MPI stress test  -TTE    HTN:  BP{ 130/84  -continue current meds    DL:  -continue atorvastgatin 60mg  daily    Bradycardia:  metop dose recently decreased - in office today SB HR 54, no presyncope/dizziness  -continue current dose metoprool      Oswaldo Tobias MD

## 2023-11-07 NOTE — PATIENT INSTRUCTIONS
Thanks for following up in office today.    1)  I have ordered two tests for you  -a heart ultrasound  -another type of stress test    2)  Please continue your cardiac medications as prescribed.    Follow up with us in one month after testing  If you have any questions, please call [phone] and choose option for Dr. Tobias's nurse Elise Jensen

## 2023-12-05 ENCOUNTER — HOSPITAL ENCOUNTER (OUTPATIENT)
Dept: RADIOLOGY | Facility: HOSPITAL | Age: 83
Discharge: HOME | End: 2023-12-05
Payer: MEDICARE

## 2023-12-05 ENCOUNTER — HOSPITAL ENCOUNTER (OUTPATIENT)
Dept: CARDIOLOGY | Facility: HOSPITAL | Age: 83
Discharge: HOME | End: 2023-12-05
Payer: MEDICARE

## 2023-12-05 DIAGNOSIS — R93.1 AGATSTON CAC SCORE, <100: ICD-10-CM

## 2023-12-05 DIAGNOSIS — I49.3 MULTIPLE PREMATURE VENTRICULAR COMPLEXES: ICD-10-CM

## 2023-12-05 DIAGNOSIS — R06.09 DYSPNEA ON EXERTION: ICD-10-CM

## 2023-12-05 DIAGNOSIS — R94.31 ABNORMAL ELECTROCARDIOGRAM (ECG) (EKG): ICD-10-CM

## 2023-12-05 PROCEDURE — 93306 TTE W/DOPPLER COMPLETE: CPT

## 2023-12-05 PROCEDURE — 93018 CV STRESS TEST I&R ONLY: CPT | Performed by: INTERNAL MEDICINE

## 2023-12-05 PROCEDURE — 78452 HT MUSCLE IMAGE SPECT MULT: CPT | Performed by: INTERNAL MEDICINE

## 2023-12-05 PROCEDURE — 93016 CV STRESS TEST SUPVJ ONLY: CPT | Performed by: INTERNAL MEDICINE

## 2023-12-05 PROCEDURE — A9502 TC99M TETROFOSMIN: HCPCS | Performed by: INTERNAL MEDICINE

## 2023-12-05 PROCEDURE — 93017 CV STRESS TEST TRACING ONLY: CPT | Mod: MUE

## 2023-12-05 PROCEDURE — 93017 CV STRESS TEST TRACING ONLY: CPT

## 2023-12-05 PROCEDURE — 3430000001 HC RX 343 DIAGNOSTIC RADIOPHARMACEUTICALS: Performed by: INTERNAL MEDICINE

## 2023-12-05 PROCEDURE — 78452 HT MUSCLE IMAGE SPECT MULT: CPT

## 2023-12-05 PROCEDURE — 93306 TTE W/DOPPLER COMPLETE: CPT | Performed by: INTERNAL MEDICINE

## 2023-12-05 RX ADMIN — TETROFOSMIN 10 MILLICURIE: 0.23 INJECTION, POWDER, LYOPHILIZED, FOR SOLUTION INTRAVENOUS at 10:15

## 2023-12-05 RX ADMIN — TETROFOSMIN 30 MILLICURIE: 0.23 INJECTION, POWDER, LYOPHILIZED, FOR SOLUTION INTRAVENOUS at 11:45

## 2023-12-06 LAB
AORTIC VALVE MEAN GRADIENT: 4.6
AORTIC VALVE PEAK VELOCITY: 1.61
AV PEAK GRADIENT: 10.4
AVA (PEAK VEL): 2.53
AVA (VTI): 2.27
EJECTION FRACTION APICAL 4 CHAMBER: 72.8
EJECTION FRACTION: 72
LEFT ATRIUM VOLUME AREA LENGTH INDEX BSA: 27.1
LEFT VENTRICLE INTERNAL DIMENSION DIASTOLE: 4.74 (ref 3.5–6)
LEFT VENTRICULAR OUTFLOW TRACT DIAMETER: 2.01
MITRAL VALVE E/A RATIO: 0.69
MITRAL VALVE E/E' RATIO: 10.1
RIGHT VENTRICLE FREE WALL PEAK S': 18.66
RIGHT VENTRICLE PEAK SYSTOLIC PRESSURE: 28.5
TRICUSPID ANNULAR PLANE SYSTOLIC EXCURSION: 2.1

## 2023-12-13 PROBLEM — M25.569 KNEE PAIN: Status: RESOLVED | Noted: 2023-03-07 | Resolved: 2023-12-13

## 2023-12-13 PROBLEM — Z86.39 HISTORY OF DIABETES MELLITUS: Status: RESOLVED | Noted: 2023-11-04 | Resolved: 2023-12-13

## 2023-12-13 PROBLEM — Z86.39 HISTORY OF ELEVATED LIPIDS: Status: RESOLVED | Noted: 2023-11-04 | Resolved: 2023-12-13

## 2023-12-13 PROBLEM — M79.89 SWELLING OF RIGHT FOOT: Status: RESOLVED | Noted: 2023-07-10 | Resolved: 2023-12-13

## 2023-12-13 PROBLEM — B35.1 ONYCHOMYCOSIS: Status: RESOLVED | Noted: 2023-07-10 | Resolved: 2023-12-13

## 2023-12-13 PROBLEM — Z00.00 ROUTINE GENERAL MEDICAL EXAMINATION AT HEALTH CARE FACILITY: Status: RESOLVED | Noted: 2023-07-11 | Resolved: 2023-12-13

## 2023-12-13 PROBLEM — R53.83 FATIGUE: Status: RESOLVED | Noted: 2023-11-04 | Resolved: 2023-12-13

## 2023-12-13 PROBLEM — Z86.79 HISTORY OF HYPERTENSION: Status: RESOLVED | Noted: 2023-11-04 | Resolved: 2023-12-13

## 2023-12-13 PROBLEM — R51.9 HEADACHE: Status: RESOLVED | Noted: 2023-11-04 | Resolved: 2023-12-13

## 2023-12-13 PROBLEM — R07.9 CHEST PAIN: Status: RESOLVED | Noted: 2023-09-01 | Resolved: 2023-12-13

## 2023-12-13 PROBLEM — B83.9 WORMS IN STOOL: Status: RESOLVED | Noted: 2023-11-04 | Resolved: 2023-12-13

## 2023-12-13 PROBLEM — B35.1 ONYCHOMYCOSIS OF TOENAIL: Status: RESOLVED | Noted: 2022-04-07 | Resolved: 2023-12-13

## 2023-12-13 PROBLEM — R05.9 COUGH: Status: RESOLVED | Noted: 2023-11-04 | Resolved: 2023-12-13

## 2023-12-13 PROBLEM — M79.89 SWELLING OF LOWER EXTREMITY: Status: RESOLVED | Noted: 2023-07-10 | Resolved: 2023-12-13

## 2023-12-13 PROBLEM — R09.81 NASAL CONGESTION: Status: RESOLVED | Noted: 2023-11-04 | Resolved: 2023-12-13

## 2024-01-02 ENCOUNTER — OFFICE VISIT (OUTPATIENT)
Dept: CARDIOLOGY | Facility: CLINIC | Age: 84
End: 2024-01-02
Payer: MEDICARE

## 2024-01-02 VITALS
HEART RATE: 51 BPM | DIASTOLIC BLOOD PRESSURE: 78 MMHG | BODY MASS INDEX: 23.99 KG/M2 | SYSTOLIC BLOOD PRESSURE: 128 MMHG | HEIGHT: 65 IN | WEIGHT: 144 LBS

## 2024-01-02 DIAGNOSIS — I89.0 LYMPHEDEMA: ICD-10-CM

## 2024-01-02 DIAGNOSIS — I10 PRIMARY HYPERTENSION: ICD-10-CM

## 2024-01-02 DIAGNOSIS — I49.3 MULTIPLE PREMATURE VENTRICULAR COMPLEXES: ICD-10-CM

## 2024-01-02 DIAGNOSIS — E78.2 MIXED HYPERLIPIDEMIA: Primary | ICD-10-CM

## 2024-01-02 DIAGNOSIS — R93.1 AGATSTON CAC SCORE, <100: ICD-10-CM

## 2024-01-02 DIAGNOSIS — R00.1 SINUS BRADYCARDIA: ICD-10-CM

## 2024-01-02 DIAGNOSIS — R07.9 CHEST PAIN, UNSPECIFIED TYPE: ICD-10-CM

## 2024-01-02 PROBLEM — R07.89 ATYPICAL CHEST PAIN: Status: ACTIVE | Noted: 2023-09-01

## 2024-01-02 PROCEDURE — 1159F MED LIST DOCD IN RCRD: CPT | Performed by: INTERNAL MEDICINE

## 2024-01-02 PROCEDURE — 93000 ELECTROCARDIOGRAM COMPLETE: CPT | Performed by: INTERNAL MEDICINE

## 2024-01-02 PROCEDURE — 1036F TOBACCO NON-USER: CPT | Performed by: INTERNAL MEDICINE

## 2024-01-02 PROCEDURE — 99214 OFFICE O/P EST MOD 30 MIN: CPT | Performed by: INTERNAL MEDICINE

## 2024-01-02 PROCEDURE — 3078F DIAST BP <80 MM HG: CPT | Performed by: INTERNAL MEDICINE

## 2024-01-02 PROCEDURE — 3074F SYST BP LT 130 MM HG: CPT | Performed by: INTERNAL MEDICINE

## 2024-01-02 PROCEDURE — 1126F AMNT PAIN NOTED NONE PRSNT: CPT | Performed by: INTERNAL MEDICINE

## 2024-01-02 RX ORDER — METOPROLOL SUCCINATE 25 MG/1
12.5 TABLET, EXTENDED RELEASE ORAL DAILY
Qty: 45 TABLET | Refills: 3
Start: 2024-01-02 | End: 2024-06-07 | Stop reason: SDUPTHER

## 2024-01-02 NOTE — PATIENT INSTRUCTIONS
Thanks for following up in office today.    1)  Your heart rates are slow.  I want you to cut your metoprolol in half and take half a tablet daily.    2)  Let your Primary Care Doctor know about the the hiatal hernia     3) I am going to refer you to the lymphedema clinic for the swelling in your legs     4)  Please continue your cardiac medications as prescribed.    Follow up with NP in 6 months  If you have any questions, please call [phone] and choose option for Dr. Tobias's nurse Elise Jensen

## 2024-01-02 NOTE — ASSESSMENT & PLAN NOTE
HR even lower than last visit, resting HR 48 today.  Wears a fitness watch, tells me her avg HR's are in the 50's but that she did have an episode of fast HR's on [place] roland to 100's.  -reduce metop succinate to half pill daily  -advised to let us know of any ongoing bradycardia/tachycardia

## 2024-01-02 NOTE — ASSESSMENT & PLAN NOTE
Last visit stress test ordered for atypical chest pain - see result - low prob of ischemia.    TTE with normal LV fcn, no rWMA.    -advised that she should see her PCP re. Her ongoing chronic cough since Covid and also history of hiatal hernia, which may be causing chest discomfort

## 2024-01-02 NOTE — PROGRESS NOTES
"Chief Complaint:   No chief complaint on file.     History Of Present Illness:    Olga Denis is a 83 y.o. female presenting for follow up testing.  Has h/o PVD and sees Dr. Brett Mcdonough.  H/o HTN, DL, DM2, Bradycardia.       Telling me her heart rate on average is in the high 50's.  Telling me she denies any chest pain.  She does report having a chronic cough since having Covid one year ago, which causes a \"raw\" feeling in the chest.  Also tells me she has had a hiatal hernia that caused chest pain in the past.    Also reporting foot/leg swelling.  Sees Dr. Mcdonough for PVD.      ROS:  The remainder of the review of systems was obtained, as was negative as pertains to the chief complaint.    CV testing reviewed:    12/2023  TTE   EF 70-75%     12/2023  Stress MPI : EF 71%  small partially reversible defect in the distal anteroseptal and apical wall resolves onprone imagine.  Also a small fixed perfusion defect in the inferolateral wall breast attenuation noted.  NO reversible defect seen.  Low probability of ischemia.    Holter 9/2023:    SB-ST HR max 134 bpm, 2+ PVC's, < 1% PACs, two episodes NSVT longest 3 beats, two patient triggers assoc with SR/SR with PAC    \7/2023 Lipid labs chol 147, HDL, LDL 77 trig 125  CT CAC Score 12/2021:  LM 29  LAD 2  LCx 2  RCA 53  Total 86    Prior hx:  Denies prior h/o CAD, MI, CHF.  She had a stress test > 5 years ago and was told everything \"was fine.\"  She is physically active, does treadmill x 20 minutes daily, does floor exercises, build a deck this year, and raked leaves recently.  Does have ORTEZ with activity, no chest pain/pressure with activity.  During her last visit with Dr. Duckworth, she did c/o pain in the middle of her chest, like a \"sharp pain,\" didn't feel well.  Does have frequent episodes of leg swelling, no orthopnea, PND.    Occ dizziness with exertion, no overt syncope.    CV meds:  atorvastatin, hydrochlorothiazide, losartan, metoprolol.     Total 86  Last " "Recorded Vitals:  Vitals:    01/02/24 1300   BP: 128/78   Pulse: 51   Weight: 65.3 kg (144 lb)   Height: 1.651 m (5' 5\")       Past Medical History:  She has a past medical history of Other nonspecific abnormal finding of lung field (12/22/2021), Personal history of malignant melanoma of skin, Personal history of other diseases of the circulatory system, Personal history of other diseases of the musculoskeletal system and connective tissue, Personal history of other endocrine, nutritional and metabolic disease, Personal history of other endocrine, nutritional and metabolic disease, Personal history of other specified conditions, and Personal history of other specified conditions (06/25/2020).    Past Surgical History:  She has a past surgical history that includes Septoplasty (05/02/2018); Tonsillectomy (05/02/2018); Bladder surgery (05/02/2018); Hysterectomy (05/02/2018); Eye surgery (05/02/2018); and Other surgical history (06/15/2022).      Social History:  She reports that she has never smoked. She has never used smokeless tobacco. She reports that she does not drink alcohol and does not use drugs.    Family History:  Family History   Problem Relation Name Age of Onset    Breast cancer Mother      Breast cancer Mother's Sister          Allergies:  Ace inhibitors, Sulfa (sulfonamide antibiotics), Amlodipine, Meloxicam, and Olmesartan    Outpatient Medications:  Current Outpatient Medications   Medication Instructions    atorvastatin (LIPITOR) 60 mg, oral, Daily RT    glucosamine HCl/chondroitin dobbs (GLUCOSAMINE-CHONDROITIN ORAL) 1 tablet, oral, Daily    hydroCHLOROthiazide (HYDRODiuril) 25 mg tablet 1 tablet, oral, Daily    losartan (Cozaar) 100 mg tablet 1 tablet, oral, Daily    metFORMIN (Glucophage) 500 mg tablet 1 tablet, oral, 2 times daily    metoprolol succinate XL (TOPROL-XL) 25 mg, oral, Daily    omeprazole (PRILOSEC) 20 mg, oral, Daily RT       Physical Exam:  Physical Exam  HENT:      Head: " Normocephalic.      Nose: Nose normal.      Mouth/Throat:      Mouth: Mucous membranes are moist.   Cardiovascular:      Rate and Rhythm: Regular rhythm. Bradycardia present.      Comments: No carotid bruits  Bilateral nonpitting edema +1-2   Pulmonary:      Effort: Pulmonary effort is normal.      Breath sounds: Normal breath sounds.   Abdominal:      Palpations: Abdomen is soft.   Musculoskeletal:         General: Normal range of motion.      Cervical back: Normal range of motion.   Skin:     General: Skin is warm and dry.   Neurological:      General: No focal deficit present.      Mental Status: She is alert.   Psychiatric:         Mood and Affect: Mood normal.            Last Labs:  CBC -  Lab Results   Component Value Date    WBC 7.5 07/10/2023    HGB 13.6 07/10/2023    HCT 41.5 07/10/2023    MCV 91 07/10/2023     07/10/2023       CMP -  Lab Results   Component Value Date    CALCIUM 9.5 07/10/2023    PROT 5.6 (L) 07/10/2023    ALBUMIN 3.8 07/10/2023    AST 15 07/10/2023    ALT 17 07/10/2023    ALKPHOS 56 07/10/2023    BILITOT 0.6 07/10/2023       LIPID PANEL -   Lab Results   Component Value Date    CHOL 147 07/10/2023    TRIG 125 07/10/2023    HDL 45.1 07/10/2023    CHHDL 3.3 07/10/2023    LDLF 77 07/10/2023    VLDL 25 07/10/2023       RENAL FUNCTION PANEL -   Lab Results   Component Value Date    GLUCOSE 129 (H) 07/10/2023     07/10/2023    K 4.4 07/10/2023     07/10/2023    CO2 30 07/10/2023    ANIONGAP 11 07/10/2023    BUN 20 07/10/2023    CREATININE 0.78 07/10/2023    CALCIUM 9.5 07/10/2023    ALBUMIN 3.8 07/10/2023        Lab Results   Component Value Date    HGBA1C 6.8 (A) 07/10/2023           Assessment/Plan   Problem List Items Addressed This Visit             ICD-10-CM       Cardiac and Vasculature    Mixed hyperlipidemia - Primary E78.2     Continue atorvastatin 60mg daily         Relevant Orders    ECG 12 Lead (Completed)    Agatston CAC score, <100 R93.1     -continue high  intensity statin - atorvastatin 60mg daily         Relevant Orders    ECG 12 Lead (Completed)    Primary hypertension I10    Relevant Orders    ECG 12 Lead (Completed)    Sinus bradycardia R00.1     HR even lower than last visit, resting HR 48 today.  Wears a fitness watch, tells me her avg HR's are in the 50's but that she did have an episode of fast HR's on [place] roland to 100's.  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

## 2024-01-11 ENCOUNTER — LAB (OUTPATIENT)
Dept: LAB | Facility: LAB | Age: 84
End: 2024-01-11
Payer: MEDICARE

## 2024-01-11 DIAGNOSIS — E11.65 TYPE 2 DIABETES MELLITUS WITH HYPERGLYCEMIA, WITHOUT LONG-TERM CURRENT USE OF INSULIN (MULTI): ICD-10-CM

## 2024-01-11 DIAGNOSIS — Z00.00 ROUTINE GENERAL MEDICAL EXAMINATION AT HEALTH CARE FACILITY: ICD-10-CM

## 2024-01-11 DIAGNOSIS — E78.2 MIXED HYPERLIPIDEMIA: ICD-10-CM

## 2024-01-11 DIAGNOSIS — I10 ESSENTIAL HYPERTENSION: ICD-10-CM

## 2024-01-11 LAB
ALBUMIN SERPL BCP-MCNC: 3.8 G/DL (ref 3.4–5)
ALP SERPL-CCNC: 58 U/L (ref 33–136)
ALT SERPL W P-5'-P-CCNC: 16 U/L (ref 7–45)
ANION GAP SERPL CALC-SCNC: 11 MMOL/L (ref 10–20)
AST SERPL W P-5'-P-CCNC: 16 U/L (ref 9–39)
BILIRUB SERPL-MCNC: 0.5 MG/DL (ref 0–1.2)
BUN SERPL-MCNC: 17 MG/DL (ref 6–23)
CALCIUM SERPL-MCNC: 9.3 MG/DL (ref 8.6–10.3)
CHLORIDE SERPL-SCNC: 100 MMOL/L (ref 98–107)
CHOLEST SERPL-MCNC: 147 MG/DL (ref 0–199)
CHOLESTEROL/HDL RATIO: 3.2
CO2 SERPL-SCNC: 29 MMOL/L (ref 21–32)
CREAT SERPL-MCNC: 0.74 MG/DL (ref 0.5–1.05)
EGFRCR SERPLBLD CKD-EPI 2021: 81 ML/MIN/1.73M*2
ERYTHROCYTE [DISTWIDTH] IN BLOOD BY AUTOMATED COUNT: 13.2 % (ref 11.5–14.5)
EST. AVERAGE GLUCOSE BLD GHB EST-MCNC: 154 MG/DL
GLUCOSE SERPL-MCNC: 123 MG/DL (ref 74–99)
HBA1C MFR BLD: 7 %
HCT VFR BLD AUTO: 40.2 % (ref 36–46)
HDLC SERPL-MCNC: 45.4 MG/DL
HGB BLD-MCNC: 13.4 G/DL (ref 12–16)
LDLC SERPL CALC-MCNC: 78 MG/DL
MCH RBC QN AUTO: 29.7 PG (ref 26–34)
MCHC RBC AUTO-ENTMCNC: 33.3 G/DL (ref 32–36)
MCV RBC AUTO: 89 FL (ref 80–100)
NON HDL CHOLESTEROL: 102 MG/DL (ref 0–149)
NRBC BLD-RTO: 0 /100 WBCS (ref 0–0)
PLATELET # BLD AUTO: 271 X10*3/UL (ref 150–450)
POTASSIUM SERPL-SCNC: 3.9 MMOL/L (ref 3.5–5.3)
PROT SERPL-MCNC: 5.8 G/DL (ref 6.4–8.2)
RBC # BLD AUTO: 4.51 X10*6/UL (ref 4–5.2)
SODIUM SERPL-SCNC: 136 MMOL/L (ref 136–145)
TRIGL SERPL-MCNC: 118 MG/DL (ref 0–149)
TSH SERPL-ACNC: 0.99 MIU/L (ref 0.44–3.98)
VIT B12 SERPL-MCNC: 617 PG/ML (ref 211–911)
VLDL: 24 MG/DL (ref 0–40)
WBC # BLD AUTO: 7 X10*3/UL (ref 4.4–11.3)

## 2024-01-11 PROCEDURE — 83036 HEMOGLOBIN GLYCOSYLATED A1C: CPT

## 2024-01-11 PROCEDURE — 80061 LIPID PANEL: CPT

## 2024-01-11 PROCEDURE — 84443 ASSAY THYROID STIM HORMONE: CPT

## 2024-01-11 PROCEDURE — 82607 VITAMIN B-12: CPT

## 2024-01-11 PROCEDURE — 36415 COLL VENOUS BLD VENIPUNCTURE: CPT

## 2024-01-11 PROCEDURE — 82652 VIT D 1 25-DIHYDROXY: CPT

## 2024-01-11 PROCEDURE — 85027 COMPLETE CBC AUTOMATED: CPT

## 2024-01-11 PROCEDURE — 80053 COMPREHEN METABOLIC PANEL: CPT

## 2024-01-13 LAB — 1,25(OH)2D3 SERPL-MCNC: 26.1 PG/ML (ref 19.9–79.3)

## 2024-01-15 ENCOUNTER — TELEPHONE (OUTPATIENT)
Dept: PRIMARY CARE | Facility: CLINIC | Age: 84
End: 2024-01-15

## 2024-01-15 ENCOUNTER — APPOINTMENT (OUTPATIENT)
Dept: PRIMARY CARE | Facility: CLINIC | Age: 84
End: 2024-01-15
Payer: MEDICARE

## 2024-01-15 DIAGNOSIS — E11.69 TYPE 2 DIABETES MELLITUS WITH OTHER SPECIFIED COMPLICATION, UNSPECIFIED WHETHER LONG TERM INSULIN USE (MULTI): ICD-10-CM

## 2024-01-15 RX ORDER — METFORMIN HYDROCHLORIDE 500 MG/1
500 TABLET ORAL 2 TIMES DAILY
Qty: 180 TABLET | Refills: 1 | Status: SHIPPED | OUTPATIENT
Start: 2024-01-15 | End: 2024-03-14 | Stop reason: SDUPTHER

## 2024-01-15 NOTE — TELEPHONE ENCOUNTER
Called Olga to reschedule her appointment on 1/15/2024 because Dr. Mane will not be in the office. She let me know that she is totally out of the following medication.  The pharmacy gave her 6 pills.     Metformin 500mg 1 tablet twcie a day.     Pharmacy: [facility]    Appointment: 1/24/2024    She was given 11 refills in Jan 2023.

## 2024-01-22 PROBLEM — R07.89 ATYPICAL CHEST PAIN: Status: RESOLVED | Noted: 2023-09-01 | Resolved: 2024-01-22

## 2024-01-22 NOTE — ASSESSMENT & PLAN NOTE
Well controlled. Continue current medicine and recheck in 6 months.   On statin, ARB  ACR next labs

## 2024-01-22 NOTE — PATIENT INSTRUCTIONS
I would like you to follow up in 6 months  Please have all labs that were ordered done at least 1 week prior to your visit.    I recommend RSV vaccine and Shingrix, prevnar 20 at the pharmacy.

## 2024-01-22 NOTE — PROGRESS NOTES
Subjective   Patient ID: Olga Denis is a 83 y.o. female who presents for Follow-up (6 month follow up labs ).  HPI  Patient presents today for follow up labs and chronic conditions.  Patient feels well. No other complaints or concerns.    The patient's relevant past medical, surgical, family, and social history was reviewed in Epic.  All pertinent lab work and results for this visit were reviewed with patient.    Lab on 01/11/2024   Component Date Value Ref Range Status    WBC 01/11/2024 7.0  4.4 - 11.3 x10*3/uL Final    nRBC 01/11/2024 0.0  0.0 - 0.0 /100 WBCs Final    RBC 01/11/2024 4.51  4.00 - 5.20 x10*6/uL Final    Hemoglobin 01/11/2024 13.4  12.0 - 16.0 g/dL Final    Hematocrit 01/11/2024 40.2  36.0 - 46.0 % Final    MCV 01/11/2024 89  80 - 100 fL Final    MCH 01/11/2024 29.7  26.0 - 34.0 pg Final    MCHC 01/11/2024 33.3  32.0 - 36.0 g/dL Final    RDW 01/11/2024 13.2  11.5 - 14.5 % Final    Platelets 01/11/2024 271  150 - 450 x10*3/uL Final    Glucose 01/11/2024 123 (H)  74 - 99 mg/dL Final    Sodium 01/11/2024 136  136 - 145 mmol/L Final    Potassium 01/11/2024 3.9  3.5 - 5.3 mmol/L Final    Chloride 01/11/2024 100  98 - 107 mmol/L Final    Bicarbonate 01/11/2024 29  21 - 32 mmol/L Final    Anion Gap 01/11/2024 11  10 - 20 mmol/L Final    Urea Nitrogen 01/11/2024 17  6 - 23 mg/dL Final    Creatinine 01/11/2024 0.74  0.50 - 1.05 mg/dL Final    eGFR 01/11/2024 81  >60 mL/min/1.73m*2 Final    Calculations of estimated GFR are performed using the 2021 CKD-EPI Study Refit equation without the race variable for the IDMS-Traceable creatinine methods.  https://jasn.asnjournals.org/content/early/2021/09/22/ASN.5809742423    Calcium 01/11/2024 9.3  8.6 - 10.3 mg/dL Final    Albumin 01/11/2024 3.8  3.4 - 5.0 g/dL Final    Alkaline Phosphatase 01/11/2024 58  33 - 136 U/L Final    Total Protein 01/11/2024 5.8 (L)  6.4 - 8.2 g/dL Final    AST 01/11/2024 16  9 - 39 U/L Final    Bilirubin, Total 01/11/2024 0.5   0.0 - 1.2 mg/dL Final    ALT 01/11/2024 16  7 - 45 U/L Final    Patients treated with Sulfasalazine may generate falsely decreased results for ALT.    Hemoglobin A1C 01/11/2024 7.0 (H)  see below % Final    Estimated Average Glucose 01/11/2024 154  Not Established mg/dL Final    Cholesterol 01/11/2024 147  0 - 199 mg/dL Final          Age      Desirable   Borderline High   High     0-19 Y     0 - 169       170 - 199     >/= 200    20-24 Y     0 - 189       190 - 224     >/= 225         >24 Y     0 - 199       200 - 239     >/= 240   **All ranges are based on fasting samples. Specific   therapeutic targets will vary based on patient-specific   cardiac risk.    Pediatric guidelines reference:Pediatrics 2011, 128(S5).Adult guidelines reference: NCEP ATPIII Guidelines,PAVEL 2001, 258:2486-97    Venipuncture immediately after or during the administration of Metamizole may lead to falsely low results. Testing should be performed immediately prior to Metamizole dosing.    HDL-Cholesterol 01/11/2024 45.4  mg/dL Final      Age       Very Low   Low     Normal    High    0-19 Y    < 35      < 40     40-45     ----  20-24 Y    ----     < 40      >45      ----        >24 Y      ----     < 40     40-60      >60      Cholesterol/HDL Ratio 01/11/2024 3.2   Final      Ref Values  Desirable  < 3.4  High Risk  > 5.0    LDL Calculated 01/11/2024 78  <=99 mg/dL Final                                Near   Borderline      AGE      Desirable  Optimal    High     High     Very High     0-19 Y     0 - 109     ---    110-129   >/= 130     ----    20-24 Y     0 - 119     ---    120-159   >/= 160     ----      >24 Y     0 -  99   100-129  130-159   160-189     >/=190      VLDL 01/11/2024 24  0 - 40 mg/dL Final    Triglycerides 01/11/2024 118  0 - 149 mg/dL Final       Age         Desirable   Borderline High   High     Very High   0 D-90 D    19 - 174         ----         ----        ----  91 D- 9 Y     0 -  74        75 -  99     >/= 100       "----    10-19 Y     0 -  89        90 - 129     >/= 130      ----    20-24 Y     0 - 114       115 - 149     >/= 150      ----         >24 Y     0 - 149       150 - 199    200- 499    >/= 500    Venipuncture immediately after or during the administration of Metamizole may lead to falsely low results. Testing should be performed immediately prior to Metamizole dosing.    Non HDL Cholesterol 01/11/2024 102  0 - 149 mg/dL Final          Age       Desirable   Borderline High   High     Very High     0-19 Y     0 - 119       120 - 144     >/= 145    >/= 160    20-24 Y     0 - 149       150 - 189     >/= 190      ----         >24 Y    30 mg/dL above LDL Cholesterol goal      Thyroid Stimulating Hormone 01/11/2024 0.99  0.44 - 3.98 mIU/L Final    Vit D, 1,25-Dihydroxy 01/11/2024 26.1  19.9 - 79.3 pg/mL Final    INTERPRETIVE INFORMATION: Vitamin D, 1,25-Dihydroxy    This test is primarily indicated during patient evaluation for   hypercalcemia and renal failure. A normal result does not rule out   Vitamin D deficiency. The recommended test for diagnosing Vitamin   D deficiency is Vitamin D 25-hydroxy.  Performed By: [facility]  [address] [place] [id number]  : Mario Whiteside MD, PhD  CLIA Number: [id number]    Vitamin B12 01/11/2024 617  211 - 911 pg/mL Final           Review of Systems   A complete review of systems was performed and all systems were normal except what is noted in the HPI.        Objective   /77 (BP Location: Right arm, Patient Position: Sitting)   Pulse 57   Temp 36.4 °C (97.6 °F)   Ht 1.651 m (5' 5\")   Wt 67.8 kg (149 lb 6.4 oz)   LMP  (LMP Unknown)   SpO2 96%   BMI 24.86 kg/m²    Physical Exam  Constitutional:       Appearance: Normal appearance.   HENT:      Head: Normocephalic and atraumatic.   Neck:      Vascular: No carotid bruit.   Cardiovascular:      Rate and Rhythm: Normal rate and regular rhythm.      Heart sounds: Normal heart sounds. "   Pulmonary:      Effort: Pulmonary effort is normal.      Breath sounds: Normal breath sounds. No wheezing, rhonchi or rales.   Abdominal:      General: Abdomen is flat. Bowel sounds are normal.      Palpations: Abdomen is soft.      Tenderness: There is no abdominal tenderness. There is no guarding.   Musculoskeletal:         General: Normal range of motion.      Right lower leg: No edema.      Left lower leg: No edema.   Skin:     General: Skin is dry.   Neurological:      General: No focal deficit present.      Mental Status: She is alert and oriented to person, place, and time.   Psychiatric:         Mood and Affect: Mood normal.         Behavior: Behavior normal.         Thought Content: Thought content normal.         Health Maintenance Due   Topic Date Due    Bone Density Scan  Never done    Diabetes: Foot Exam  Never done    Influenza Vaccine (1) 09/01/2023    COVID-19 Vaccine (6 - 2023-24 season) 12/28/2023    Diabetes: Urine Protein Screening  01/04/2024    Diabetes: Retinopathy Screening  03/10/2024        Assessment/Plan   Problem List Items Addressed This Visit       Mixed hyperlipidemia     Well controlled. Continue current medicine and recheck in 6 months.          Relevant Orders    Cholesterol, LDL Direct    Lipid Panel    Primary hypertension     Well controlled. Continue current medicine and recheck in 6 months.          Relevant Orders    Comprehensive Metabolic Panel    Type 2 diabetes mellitus (CMS/HCC) - Primary     Well controlled. Continue current medicine and recheck in 6 months.   On statin, ARB  ACR next labs         Relevant Orders    Comprehensive Metabolic Panel    Albumin , Urine Random    Hemoglobin A1C    Sinus bradycardia     Metoprolol recently decreased per cardiology         Peripheral arterial disease (CMS/HCC)     asymptomatic and stable  On statin          Other Visit Diagnoses       Asymptomatic menopause        Relevant Orders    XR DEXA bone density    Breast screening         Relevant Orders    BI mammo bilateral screening tomosynthesis    Encounter for screening mammogram for malignant neoplasm of breast        Relevant Orders    BI mammo bilateral screening tomosynthesis              Patient and family understand and agree with care plan.           Mamie Mane MD

## 2024-01-24 ENCOUNTER — OFFICE VISIT (OUTPATIENT)
Dept: PRIMARY CARE | Facility: CLINIC | Age: 84
End: 2024-01-24
Payer: MEDICARE

## 2024-01-24 VITALS
BODY MASS INDEX: 24.89 KG/M2 | SYSTOLIC BLOOD PRESSURE: 124 MMHG | WEIGHT: 149.4 LBS | OXYGEN SATURATION: 96 % | HEART RATE: 57 BPM | TEMPERATURE: 97.6 F | HEIGHT: 65 IN | DIASTOLIC BLOOD PRESSURE: 77 MMHG

## 2024-01-24 DIAGNOSIS — I73.9 PERIPHERAL ARTERIAL DISEASE (CMS-HCC): ICD-10-CM

## 2024-01-24 DIAGNOSIS — Z12.39 BREAST SCREENING: ICD-10-CM

## 2024-01-24 DIAGNOSIS — I10 PRIMARY HYPERTENSION: ICD-10-CM

## 2024-01-24 DIAGNOSIS — Z78.0 ASYMPTOMATIC MENOPAUSE: ICD-10-CM

## 2024-01-24 DIAGNOSIS — E78.2 MIXED HYPERLIPIDEMIA: ICD-10-CM

## 2024-01-24 DIAGNOSIS — R00.1 SINUS BRADYCARDIA: ICD-10-CM

## 2024-01-24 DIAGNOSIS — E11.51 TYPE 2 DIABETES MELLITUS WITH DIABETIC PERIPHERAL ANGIOPATHY WITHOUT GANGRENE, WITHOUT LONG-TERM CURRENT USE OF INSULIN (MULTI): Primary | ICD-10-CM

## 2024-01-24 DIAGNOSIS — Z12.31 ENCOUNTER FOR SCREENING MAMMOGRAM FOR MALIGNANT NEOPLASM OF BREAST: ICD-10-CM

## 2024-01-24 PROCEDURE — 3074F SYST BP LT 130 MM HG: CPT | Performed by: FAMILY MEDICINE

## 2024-01-24 PROCEDURE — 3078F DIAST BP <80 MM HG: CPT | Performed by: FAMILY MEDICINE

## 2024-01-24 PROCEDURE — 1159F MED LIST DOCD IN RCRD: CPT | Performed by: FAMILY MEDICINE

## 2024-01-24 PROCEDURE — 1124F ACP DISCUSS-NO DSCNMKR DOCD: CPT | Performed by: FAMILY MEDICINE

## 2024-01-24 PROCEDURE — 99214 OFFICE O/P EST MOD 30 MIN: CPT | Performed by: FAMILY MEDICINE

## 2024-01-24 PROCEDURE — 1126F AMNT PAIN NOTED NONE PRSNT: CPT | Performed by: FAMILY MEDICINE

## 2024-01-24 PROCEDURE — 1036F TOBACCO NON-USER: CPT | Performed by: FAMILY MEDICINE

## 2024-01-26 PROCEDURE — 93244 EXT ECG>48HR<7D REV&INTERPJ: CPT | Performed by: INTERNAL MEDICINE

## 2024-01-27 ENCOUNTER — PATIENT MESSAGE (OUTPATIENT)
Dept: PRIMARY CARE | Facility: CLINIC | Age: 84
End: 2024-01-27
Payer: MEDICARE

## 2024-01-27 DIAGNOSIS — E78.2 MIXED HYPERLIPIDEMIA: ICD-10-CM

## 2024-01-29 RX ORDER — ATORVASTATIN CALCIUM 40 MG/1
60 TABLET, FILM COATED ORAL
Qty: 135 TABLET | Refills: 1 | Status: SHIPPED | OUTPATIENT
Start: 2024-01-29

## 2024-01-29 NOTE — TELEPHONE ENCOUNTER
From: Olga Denis  To: LISANDRO Barrientos  Sent: 1/27/2024 8:35 AM EST  Subject: Atorvastatin refill    Please send a refill for my Atorvastatin to [facility]

## 2024-02-02 ENCOUNTER — PATIENT MESSAGE (OUTPATIENT)
Dept: PRIMARY CARE | Facility: CLINIC | Age: 84
End: 2024-02-02
Payer: MEDICARE

## 2024-02-02 DIAGNOSIS — I10 PRIMARY HYPERTENSION: ICD-10-CM

## 2024-02-02 RX ORDER — HYDROCHLOROTHIAZIDE 25 MG/1
25 TABLET ORAL DAILY
Qty: 90 TABLET | Refills: 3 | Status: SHIPPED | OUTPATIENT
Start: 2024-02-02

## 2024-02-02 NOTE — TELEPHONE ENCOUNTER
From: Olga Denis  To: Mamie Mane MD  Sent: 2/2/2024 9:14 AM EST  Subject: refill    Please send refill for Hydrochlorothiazide 25 mg to [facility] [place] [phone]

## 2024-03-14 ENCOUNTER — PATIENT MESSAGE (OUTPATIENT)
Dept: PRIMARY CARE | Facility: CLINIC | Age: 84
End: 2024-03-14
Payer: MEDICARE

## 2024-03-14 DIAGNOSIS — I10 PRIMARY HYPERTENSION: ICD-10-CM

## 2024-03-14 DIAGNOSIS — E11.69 TYPE 2 DIABETES MELLITUS WITH OTHER SPECIFIED COMPLICATION, UNSPECIFIED WHETHER LONG TERM INSULIN USE (MULTI): ICD-10-CM

## 2024-03-14 RX ORDER — METFORMIN HYDROCHLORIDE 500 MG/1
500 TABLET ORAL 2 TIMES DAILY
Qty: 180 TABLET | Refills: 3 | Status: SHIPPED | OUTPATIENT
Start: 2024-03-14

## 2024-03-14 RX ORDER — LOSARTAN POTASSIUM 100 MG/1
100 TABLET ORAL DAILY
Qty: 90 TABLET | Refills: 3 | Status: SHIPPED | OUTPATIENT
Start: 2024-03-14

## 2024-03-14 NOTE — TELEPHONE ENCOUNTER
From: Olga Denis  To: Mamie Mane MD  Sent: 3/14/2024 9:35 AM EDT  Subject: Metformin and Losartan refills    Please send refills of Losartan Potassium 100 MG, one per day and Metformin  MG twice per day to [facility], . Thank you.

## 2024-05-21 DIAGNOSIS — K21.9 GASTROESOPHAGEAL REFLUX DISEASE, UNSPECIFIED WHETHER ESOPHAGITIS PRESENT: ICD-10-CM

## 2024-05-22 RX ORDER — OMEPRAZOLE 20 MG/1
20 CAPSULE, DELAYED RELEASE ORAL
Qty: 90 CAPSULE | Refills: 3 | Status: SHIPPED | OUTPATIENT
Start: 2024-05-22

## 2024-06-07 DIAGNOSIS — R07.9 CHEST PAIN, UNSPECIFIED TYPE: ICD-10-CM

## 2024-06-07 RX ORDER — METOPROLOL SUCCINATE 25 MG/1
12.5 TABLET, EXTENDED RELEASE ORAL DAILY
Qty: 45 TABLET | Refills: 3 | Status: SHIPPED | OUTPATIENT
Start: 2024-06-07 | End: 2025-06-07

## 2024-07-12 NOTE — PROGRESS NOTES
Chief Complaint/Reason for Visit:   Patient is coming in today as a 6 month Cardiovascular follow up.      History Of Present Illness:    Ms Denis is coming in today as a 6-month cardiovascular follow-up.  We have followed this patient previously for hypertension, dyslipidemia, and bradycardia.    Patient comes in today feeling well.  She has had no recent hospitalizations or emergency room visits.  She is under a lot of stress due to her  and daughter having medical issues.  She at times forgets to eat and does not always take care of herself.  She has been good about taking her medication as prescribed.  She denies any chest pain, pressure, palpitations, or orthopnea.  She has ongoing issues with chronic lower extremity edema/lymphedema.    Past Medical History:  She has a past medical history of Other nonspecific abnormal finding of lung field (12/22/2021), Personal history of malignant melanoma of skin, Personal history of other diseases of the circulatory system, Personal history of other diseases of the musculoskeletal system and connective tissue, Personal history of other endocrine, nutritional and metabolic disease, Personal history of other endocrine, nutritional and metabolic disease, Personal history of other specified conditions, and Personal history of other specified conditions (06/25/2020).    Past Surgical History:  She has a past surgical history that includes Septoplasty (05/02/2018); Tonsillectomy (05/02/2018); Bladder surgery (05/02/2018); Hysterectomy (05/02/2018); Eye surgery (05/02/2018); and Other surgical history (06/15/2022).      Social History:  She reports that she has never smoked. She has never used smokeless tobacco. She reports that she does not drink alcohol and does not use drugs.    Family History:  Family History   Problem Relation Name Age of Onset    Breast cancer Mother      Breast cancer Mother's Sister          Allergies:  Ace inhibitors, Sulfa (sulfonamide  "antibiotics), Amlodipine, Meloxicam, and Olmesartan    Medications:  Current Outpatient Medications   Medication Instructions    atorvastatin (LIPITOR) 60 mg, oral, Daily RT    glucosamine HCl/chondroitin dobbs (GLUCOSAMINE-CHONDROITIN ORAL) 1 tablet, oral, Daily    hydroCHLOROthiazide (HYDRODIURIL) 25 mg, oral, Daily    losartan (COZAAR) 100 mg, oral, Daily    metFORMIN (GLUCOPHAGE) 500 mg, oral, 2 times daily    metoprolol succinate XL (TOPROL-XL) 12.5 mg, oral, Daily    omeprazole (PRILOSEC) 20 mg, oral, Daily RT       Review of Systems:  Review of Systems   Constitutional: Negative. Negative for decreased appetite and malaise/fatigue.   HENT: Negative.     Eyes:  Negative for blurred vision and visual disturbance.   Cardiovascular:  Positive for leg swelling. Negative for chest pain, dyspnea on exertion, irregular heartbeat, orthopnea, palpitations and syncope.   Respiratory: Negative.  Negative for cough and shortness of breath.    Musculoskeletal:  Positive for arthritis. Negative for falls.   Gastrointestinal: Negative.    Neurological:  Negative for focal weakness and light-headedness.   Psychiatric/Behavioral:  Negative for depression and memory loss.         Increased stress         Vitals  Visit Vitals  /72 (BP Location: Right arm, Patient Position: Sitting, BP Cuff Size: Adult)   Pulse 52   Ht 1.651 m (5' 5\")   Wt 68 kg (150 lb)   LMP  (LMP Unknown)   SpO2 98%   BMI 24.96 kg/m²   OB Status Postmenopausal   Smoking Status Never   BSA 1.77 m²        Physical Exam:  Physical Exam  Constitutional:       Appearance: Normal appearance.   HENT:      Head: Normocephalic.   Eyes:      Conjunctiva/sclera: Conjunctivae normal.   Cardiovascular:      Rate and Rhythm: Normal rate and regular rhythm.      Pulses: Normal pulses.      Heart sounds: S1 normal and S2 normal. No murmur heard.     No friction rub. No gallop.   Pulmonary:      Effort: Pulmonary effort is normal.      Breath sounds: Normal breath sounds. "   Abdominal:      General: Bowel sounds are normal.      Palpations: Abdomen is soft.      Tenderness: There is no abdominal tenderness.   Musculoskeletal:      Cervical back: Neck supple.      Right lower leg: Edema present.      Left lower leg: Edema present.      Comments: 1-2+   Skin:     General: Skin is warm and dry.   Neurological:      General: No focal deficit present.      Mental Status: She is alert and oriented to person, place, and time.   Psychiatric:         Attention and Perception: Attention normal.         Mood and Affect: Mood normal.           Last Labs:  CBC -  Lab Results   Component Value Date    WBC 7.0 01/11/2024    HGB 13.4 01/11/2024    HCT 40.2 01/11/2024    MCV 89 01/11/2024     01/11/2024     Lab Results   Component Value Date    GLUCOSE 123 (H) 01/11/2024    CALCIUM 9.3 01/11/2024     01/11/2024    K 3.9 01/11/2024    CO2 29 01/11/2024     01/11/2024    BUN 17 01/11/2024    CREATININE 0.74 01/11/2024      CMP -  Lab Results   Component Value Date    CALCIUM 9.3 01/11/2024    PROT 5.8 (L) 01/11/2024    ALBUMIN 3.8 01/11/2024    AST 16 01/11/2024    ALT 16 01/11/2024    ALKPHOS 58 01/11/2024    BILITOT 0.5 01/11/2024       LIPID PANEL -   Lab Results   Component Value Date    CHOL 147 01/11/2024    TRIG 118 01/11/2024    HDL 45.4 01/11/2024    CHHDL 3.2 01/11/2024    LDLF 77 07/10/2023    VLDL 24 01/11/2024    NHDL 102 01/11/2024       Lab Results   Component Value Date    HGBA1C 7.0 (H) 01/11/2024       Last Cardiology Tests:    Echo:12-5-23  CONCLUSIONS:   1. Left ventricular systolic function is normal with a 70-75% estimated ejection fraction.     Stress Test:12-5-23  Summary:   1. Adequate level of stress achieved.   2. No clinical or electrocardiographic evidence for ischemia at maximal workload.   3. Correlate with myocardial perfusion imaging results.   4. Nuclear image results are reported separately.     Impression   1.  There is a small partially reversible  perfusion defect in the  distal anteroseptal and apical wall which resolves on prone imaging  suggesting soft tissue attenuation. There is also a small fixed  perfusion defect in the inferolateral wall. Breast attenuation noted.  No reversible perfusion defects seen. There is a low probability of  ischemia.      2. Calculated ejection fraction of 71% without segmental wall motion  abnormality seen.       Lab review: I have personally reviewed the laboratory result(s)     Assessment/Plan:  Hypertension: Blood pressure today initially was elevated but came down after she sat and rested.  She was 120/72.  Patient will continue on her current dose of hydrochlorothiazide, losartan, and metoprolol.  I encouraged her to be on a low-sodium diet.    Dyslipidemia: Lipid labs from January, 2024 show  triglycerides 118, LDL 78.  Patient will continue on her current dose of atorvastatin in addition to eating a heart healthy diet.  She is due for fasting lab work this summer which I reminded her of.    Bradycardia: Patient's heart rate has improved with the reduction of beta-blocker dose.  She will continue on metoprolol succinate 12.5 mg daily.  Patient's heart rate has been 50s and 60s.  She is asymptomatic.    Lymphedema of the lower extremities: I discussed referring the patient to the lymphedema clinic but the patient declines.  Patient is not interested in any further workup or treatment.  I did discuss the importance of eating a low-sodium diet and trying to keep her legs elevated.  She also was encouraged to exercise.  We discussed the possibility of compression stockings but the patient declined.    Patient was reminded of her labs that are due this summer.  She will follow-up with Dr. Tobias in 6 months..  She will follow-up with Dr. Tobias in 6 months.  Patient declined a referral to the lymphedema clinic.    Aishwarya Ovalle, APRN-CNP

## 2024-07-15 ENCOUNTER — APPOINTMENT (OUTPATIENT)
Dept: CARDIOLOGY | Facility: CLINIC | Age: 84
End: 2024-07-15
Payer: MEDICARE

## 2024-07-15 VITALS
BODY MASS INDEX: 24.99 KG/M2 | OXYGEN SATURATION: 98 % | WEIGHT: 150 LBS | HEIGHT: 65 IN | DIASTOLIC BLOOD PRESSURE: 72 MMHG | SYSTOLIC BLOOD PRESSURE: 120 MMHG | HEART RATE: 52 BPM

## 2024-07-15 DIAGNOSIS — R93.1 AGATSTON CAC SCORE, <100: ICD-10-CM

## 2024-07-15 DIAGNOSIS — I10 PRIMARY HYPERTENSION: ICD-10-CM

## 2024-07-15 DIAGNOSIS — R00.1 SINUS BRADYCARDIA: ICD-10-CM

## 2024-07-15 DIAGNOSIS — I89.0 LYMPHEDEMA: ICD-10-CM

## 2024-07-15 DIAGNOSIS — E78.2 MIXED HYPERLIPIDEMIA: Primary | ICD-10-CM

## 2024-07-15 PROCEDURE — 3074F SYST BP LT 130 MM HG: CPT | Performed by: NURSE PRACTITIONER

## 2024-07-15 PROCEDURE — 1123F ACP DISCUSS/DSCN MKR DOCD: CPT | Performed by: NURSE PRACTITIONER

## 2024-07-15 PROCEDURE — 99214 OFFICE O/P EST MOD 30 MIN: CPT | Performed by: NURSE PRACTITIONER

## 2024-07-15 PROCEDURE — 1036F TOBACCO NON-USER: CPT | Performed by: NURSE PRACTITIONER

## 2024-07-15 PROCEDURE — 3078F DIAST BP <80 MM HG: CPT | Performed by: NURSE PRACTITIONER

## 2024-07-15 PROCEDURE — 1159F MED LIST DOCD IN RCRD: CPT | Performed by: NURSE PRACTITIONER

## 2024-07-15 ASSESSMENT — ENCOUNTER SYMPTOMS
SHORTNESS OF BREATH: 0
GASTROINTESTINAL NEGATIVE: 1
DEPRESSION: 0
BLURRED VISION: 0
IRREGULAR HEARTBEAT: 0
DECREASED APPETITE: 0
DYSPNEA ON EXERTION: 0
PALPITATIONS: 0
LIGHT-HEADEDNESS: 0
FALLS: 0
FOCAL WEAKNESS: 0
SYNCOPE: 0
COUGH: 0
ORTHOPNEA: 0
CONSTITUTIONAL NEGATIVE: 1
RESPIRATORY NEGATIVE: 1
MEMORY LOSS: 0

## 2024-07-15 NOTE — PATIENT INSTRUCTIONS
Continue on current meds  Heart healthy, low sodium diet  Mediterranean diet is recommended  Lymphedema clinic was offered, patient declined.   Follow up with Dr Tobias in 6 months

## 2024-07-21 PROBLEM — M71.20 BAKER'S CYST: Status: ACTIVE | Noted: 2024-07-21

## 2024-07-21 NOTE — PROGRESS NOTES
MD Miguel Harrison MD Margery Curia, MD                  Office: [phone]                      Fax: [phone] [facility]                   NEPHROLOGY INPATIENT PROGRESS NOTE:     PATIENT NAME: Ruperto Lawton  : 1938  MRN: [id number]      RECOMMENDATIONS: worsening GINAA : pre-renal + d/to AUR  - Stop Lasix 40 iv BID -   - fup w/ cardiology ECHO      - bills insertion needed for ARU   - urology consulted - \" Can dc bills , Str cath prn bl scan over 550 , Will follow     - recheck UA in 48hrs, to fup on hematuria on UA  - no need for dialysis,  at higher risk for decompensation, needing closer monitoring.    - hold LHC plans for 24 hrs, as very higher risk for CI-GIANA, unless needed urgently today     D/C plan from renal stand point: ~2-3 days- fup fluid overload  Continue to fup w/ in outpt   D/w team nurse hospitalist   D/W pt's daughter too     IMPRESSION:       Admitted on:  2022  5:07 PM   For:  NSTEMI (non-ST elevated myocardial infarction) ([facility] [I21.4]  Acute respiratory failure with hypoxia ([facility] [J96.01]      H/o GIANA - not now  Proteinuric CKD-3B    - was on torsemide + Aldactone      - BL CKD - stage 3B ,   - Scr ~ 1.2-1.3    - eGFR BL: 30s  - at risk for CI-GIANA , as s/p CTA       Urinary retention   - >700 cc in ER - needing bills insertion      Associated problems:   - Volume status: hyper-volemic  Acute on chronic- dheart failure-  CXR - Cardiomegaly with mild pulmonary venous congestion. Weight in past around 116-119 pounds,   Lowest 113 lbs  Now - 116 lbs in office   At home - ~116 lbs  Now inpatient: 110 lbs     : HTN : no need for tight control   : Na: WNL    - Azotemia: pre-renal  - Electrolytes: K: WNL  Mag WNL  - Acid-Base: acidosis - non AGMA   - Anemia: no         Other major problems: Management per primary and other consulting teams.          Hospital Problems             Last Modified POA    * (Principal) Acute respiratory failure Subjective   Patient ID: Olga Denis is a 83 y.o. female who presents for Knee Pain (Reports left knee pain, increased recently ) and Hip Pain (Left hip pain, increased recently ).  HPI  Patient presents today with complaint of knee pain. Remote h/o injury but now with complaint of several month history of pain and swelling left popliteal fossa. Some relief NSAIDs.    Otherwise feels well. No other complaints or concerns.    The patient's relevant past medical, surgical, family and social history was reviewed in Epic.    Review of Systems  A complete review of systems was performed and all systems were normal except what is noted in the HPI.    Objective   Physical Exam  Constitutional:       Appearance: Normal appearance.   Musculoskeletal:      Left knee: Swelling present. No deformity, erythema or bony tenderness. Normal range of motion. Tenderness (popliteal fossa) present.   Neurological:      Mental Status: She is alert.             Assessment/Plan   Problem List Items Addressed This Visit       Baker's cyst - Primary    Relevant Orders    Lower extremity venous duplex left    Referral to Orthopaedic Surgery     Other Visit Diagnoses       Other specified soft tissue disorders        Relevant Orders    Lower extremity venous duplex left             Patient understands and agrees with treatment plan.         Mamie Mane MD    with hypoxia ([facility] 11/11/2022 Yes    Essential hypertension, benign 11/12/2022 Yes    Chronic combined systolic (congestive) and diastolic (congestive) heart failure ([facility] 11/12/2022 Yes    NSTEMI (non-ST elevated myocardial infarction) ([facility] 11/12/2022 Yes     : other supportive care :   - Check daily renal function panel with electrolytes-phosphorus  - Strict monitoring of I/Os, daily weight  - non-Renal feeds/diet  - Current medications reviewed. Please refer to the orders. High Complexity. Multiple complex problems. Discussed with patient and treatment team-    Time spent > 30 (~35) minutes. Thank you for allowing me to participate in this patient's care. Please do not hesitate to contact me anytime. We will follow along with you. Eric Lagunas MD,  [facility] [address] [place] [address] [place]: [phone] or Via [employer]  Fax: [phone]        =======================================================================================   =======================================================================================  Subjective / interval history:   Patient was seen comfortably  in the bed,   Reported no active complaints,   No hypoxia  BP stable   Renal function worse still   I/O, weights reviewed     No current active complaints. Patient denied chest pain / dizziness/lightheadedness/syncope/ SOB / leg edema. .      Past medical, Surgical, Social, Family medical history reviewed by me.      PAST MEDICAL HISTORY:   Past Medical History:   Diagnosis Date    Allergic rhinitis, cause unspecified     Arthritis     Atrial fibrillation (HCC)     Bronchopneumonia     CAD (coronary artery disease)     stent:  post cataract surgery (CABG)    Cerebral artery occlusion with cerebral infarction [facility])     TIA    Chronic gouty arthropathy     Chronic kidney disease     Congestive heart failure, unspecified     Degeneration of cervical intervertebral disc     Essential and other specified forms of tremor     Gout     HIGH CHOLESTEROL     History of CVA (cerebrovascular accident)     Hx of blood clots     Hypertension     Influenza 12/23/2017    Leakage of Watchman left atrial appendage closure device     Mitral valve stenosis and aortic valve insufficiency     Movement disorder     back problems    Pacemaker     Peptic ulcer, unspecified site, unspecified as acute or chronic, without mention of hemorrhage, perforation, or obstruction     Thyroid disease     Unspecified disorder of kidney and ureter     Unspecified hypothyroidism      PAST SURGICAL HISTORY:   Past Surgical History:   Procedure Laterality Date    BACK SURGERY      CARDIAC CATHETERIZATION  7/2012    CARDIAC CATHETERIZATION  08/07/2018    Unsuccesful  of RCA    CARDIAC SURGERY      CABG & Cardiac ablation    CHOLECYSTECTOMY      CORONARY ARTERY BYPASS GRAFT  1987    LIMA- Diag/LAD, SVG- RCA    FEMORAL BYPASS Left 8/22/2019    LEFT FEMORAL TO POPLITEAL BYPASS GRAFT performed by Keon Ordonez MD at Replaced by [facility] [address] [address] N/A 8/22/2019    FEMORAL TO FEMORAL BYPASS performed by Keon Ordonez MD at [address] Left 03/16/2017    Left hip pinning    IR KYPHOPLASTY LUMBAR FIRST LEVEL  5/14/2021    IR KYPHOPLASTY LUMBAR FIRST LEVEL 5/14/2021 [facility] SPECIAL PROCEDURES    JOINT REPLACEMENT      ID NJX AA&/STRD TFRML EPI LUMBAR/SACRAL 1 LEVEL Right 12/3/2018    RIGHT L3 AND L4 LUMBAR TRANSFORAMINAL EPIRUAL STEROID INJECTION WITH FLUOROSCOPY performed by Vivek Vidal MD at [facility]    PTCA  11/04/2014    MARLENY - 3.0 x 28 to the Ist Diag    SHOULDER SURGERY      left     FAMILY HISTORY:   Family History   Problem Relation Age of Onset    Cancer Sister     Heart Disease Sister     Diabetes Brother     Hypertension Brother     Heart Disease Brother     Stroke Maternal Aunt     Heart Disease Maternal Aunt     Diabetes Maternal Uncle     Hypertension Paternal Aunt     Cancer Maternal Grandmother     Cancer Paternal Grandmother     Rheum Arthritis Neg Hx     Lupus Neg Hx      SOCIAL HISTORY:   Social History     Socioeconomic History    Marital status:      Spouse name: Medina Gonzales    Number of children: 3    Years of education: 12    Highest education level: None   Tobacco Use    Smoking status: Former     Packs/day: 1.00     Years: 28.00     Pack years: 28.00     Types: Cigarettes     Quit date: 1987     Years since quittin.8    Smokeless tobacco: Never    Tobacco comments:     H.O.smoking at age 17 / smoked up to 1 p.p.d / quit    Vaping Use    Vaping Use: Never used   Substance and Sexual Activity    Alcohol use: Yes     Comment: social    Drug use: No    Sexual activity: Not Currently     Social Determinants of Health     Financial Resource Strain: Low Risk     Difficulty of Paying Living Expenses: Not hard at all   Food Insecurity: No Food Insecurity    Worried About [address] [employer] in the Last Year: Never true    [facility] [employer] in the Last Year: Never true   Transportation Needs: No Transportation Needs    Lack of Transportation (Medical): No    Lack of Transportation (Non-Medical): No   Physical Activity: Inactive    Days of Exercise per Week: 0 days    Minutes of Exercise per Session: 0 min   Stress: No Stress Concern Present    Feeling of Stress : Not at all   Social Connections: Socially Isolated    Frequency of Communication with Friends and Family: Three times a week    Frequency of Social Gatherings with Friends and Family: Three times a week    Attends Oriental orthodox Services: Never    Active Member of Clubs or Organizations: No    Attends Club or Organization Meetings: Never    Marital Status:    Housing Stability: Low Risk     Unable to Pay for Housing in the Last Year: No    Number of Places Lived in the Last Year: 1    Unstable Housing in the Last Year: No         MEDICATIONS: reviewed by me.    Medications Prior to Admission:  No current facility-administered medications on file prior to encounter. Current Outpatient Medications on File Prior to Encounter   Medication Sig Dispense Refill    HYDROcodone-acetaminophen (NORCO) 5-325 MG per tablet Take 1 tablet by mouth 4 times daily as needed for Pain for up to 30 days. 120 tablet 0    carvedilol (COREG) 25 MG tablet Take 1 tablet by mouth 2 times daily (with meals) 180 tablet 3    rosuvastatin (CRESTOR) 5 MG tablet Take 1 tablet by mouth nightly 90 tablet 1    levothyroxine (SYNTHROID) 125 MCG tablet Take 1 tablet by mouth Daily 90 tablet 0    aspirin EC 81 MG EC tablet Take 1 tablet by mouth daily 90 tablet 1    topiramate (TOPAMAX) 50 MG tablet TAKE 2 TABLETS TWICE DAILY 360 tablet 0    torsemide (DEMADEX) 20 MG tablet 10 mg on MWF 30 tablet 3    albuterol sulfate HFA (VENTOLIN HFA) 108 (90 Base) MCG/ACT inhaler Inhale 2 puffs into the lungs 4 times daily as needed for Wheezing 18 g 0    vitamin D (ERGOCALCIFEROL) 1.25 MG (45075 UT) CAPS capsule TAKE 1 CAPSULE ONCE A WEEK 12 capsule 1    clopidogrel (PLAVIX) 75 MG tablet Take 1 tablet by mouth in the morning.  30 tablet 3    allopurinol (ZYLOPRIM) 100 MG tablet TAKE 1 TABLET EVERY DAY 90 tablet 1    nitroGLYCERIN (NITROLINGUAL) 0.4 MG/SPRAY 0.4 mg spray Place 1 spray under the tongue every 5 minutes as needed for Chest pain 4.9 g 1    fluticasone (FLONASE) 50 MCG/ACT nasal spray 2 sprays by Each Nostril route daily 16 g 0         Current Facility-Administered Medications:     0.9 % sodium chloride infusion, , IntraVENous, Continuous, Shaggy Saucedo MD    melatonin tablet 3 mg, 3 mg, Oral, Nightly PRN, Elsie Gannon APRN - CNP, 3 mg at 11/13/22 2131    albuterol sulfate HFA (PROVENTIL;VENTOLIN;PROAIR) 108 (90 Base) MCG/ACT inhaler 2 puff, 2 puff, Inhalation, 4x Daily PRN, Elsie Gannon APRN - CNP    allopurinol (ZYLOPRIM) tablet 100 mg, 100 mg, Oral, Daily, Elsie Jagdeep APRN - CNP, 100 mg at 11/14/22 0855    aspirin EC tablet 81 mg, 81 mg, Oral, Daily, Tala Lea, APRN - CNP, 81 mg at 11/14/22 0855    carvedilol (COREG) tablet 25 mg, 25 mg, Oral, BID WC, Elsie Minick, APRN - CNP, 25 mg at 11/14/22 0856    HYDROcodone-acetaminophen (NORCO) 5-325 MG per tablet 1 tablet, 1 tablet, Oral, 4x Daily PRN, Elsie Minick, APRN - CNP, 1 tablet at 11/13/22 2130    levothyroxine (SYNTHROID) tablet 125 mcg, 125 mcg, Oral, Daily, Elsie Minick, APRN - CNP, 125 mcg at 11/13/22 0601    rosuvastatin (CRESTOR) tablet 5 mg, 5 mg, Oral, Nightly, Elsie Minick, APRN - CNP, 5 mg at 11/13/22 2130    topiramate (TOPAMAX) tablet 50 mg, 50 mg, Oral, BID, Elsie Minick, APRN - CNP, 50 mg at 11/14/22 0855    sodium chloride flush 0.9 % injection 5-40 mL, 5-40 mL, IntraVENous, 2 times per day, Rachel Valdez, APRN - CNP, 10 mL at 11/14/22 0858    sodium chloride flush 0.9 % injection 5-40 mL, 5-40 mL, IntraVENous, PRN, Elsie Yanetck, APRN - CNP, 10 mL at 11/12/22 2154    0.9 % sodium chloride infusion, , IntraVENous, PRN, Elsie Minick, APRN - CNP    ondansetron (ZOFRAN-ODT) disintegrating tablet 4 mg, 4 mg, Oral, Q8H PRN **OR** ondansetron (ZOFRAN) injection 4 mg, 4 mg, IntraVENous, Q6H PRN, Elsie Minick, APRN - CNP    polyethylene glycol (GLYCOLAX) packet 17 g, 17 g, Oral, Daily PRN, Elsie Minick, APRN - CNP, 17 g at 11/12/22 0559    acetaminophen (TYLENOL) tablet 650 mg, 650 mg, Oral, Q6H PRN **OR** acetaminophen (TYLENOL) suppository 650 mg, 650 mg, Rectal, Q6H PRN, Elsie Minick, APRN - CNP    perflutren lipid microspheres (DEFINITY) injection 1.5 mL, 1.5 mL, IntraVENous, ONCE PRN, Elsie Minick, APRN - CNP    heparin (porcine) injection 3,010 Units, 60 Units/kg, IntraVENous, PRN, Elsie Minick, APRN - CNP, 3,010 Units at 11/12/22 1420    heparin (porcine) injection 1,500 Units, 30 Units/kg, IntraVENous, PRN, Elsie Minick, APRN - CNP, 1,500 Units at 11/13/22 0622    heparin 25,000 units in dextrose 5% 250 mL (premix) infusion, 5-30 Units/kg/hr, IntraVENous, Continuous, Elsie Gannon, APRN - CNP, Last Rate: 10 mL/hr at 11/13/22 0620, 20 Units/kg/hr at 11/13/22 9065    clopidogrel (PLAVIX) tablet 75 mg, 75 mg, Oral, Daily, Elsie Yanettom, TIFFANIE - CNP, 75 mg at 11/14/22 0856    nitroGLYCERIN (NITROSTAT) SL tablet 0.4 mg, 0.4 mg, SubLINGual, Q5 Min PRN, Washington Whitman MD      Allergies reviewed by me: Aspirin, Diltiazem, Diltiazem hcl, Lorazepam, Sulfa antibiotics, Atorvastatin, Dabigatran, Mysoline [primidone], Nsaids, Other, and Primidone    REVIEW OF SYSTEMS:  As mentioned in chief complaint and HPI , Subjective   All other 10-point review of systems: negative.          =======================================================================================     PHYSICAL EXAM:  Recent vital signs and recent I/Os reviewed by me. Wt Readings from Last 3 Encounters:   11/14/22 109 lb 6.4 oz (49.6 kg)   11/07/22 114 lb 6.4 oz (51.9 kg)   10/28/22 116 lb (52.6 kg)     BP Readings from Last 3 Encounters:   11/14/22 115/63   11/07/22 120/70   10/28/22 122/70     Patient Vitals for the past 24 hrs:   BP Temp Temp src Pulse Resp SpO2 Weight   11/14/22 0717 115/63 98 °F (36.7 °C) Oral 64 18 99 % --   11/14/22 0320 118/78 97.3 °F (36.3 °C) Oral 78 16 97 % 109 lb 6.4 oz (49.6 kg)   11/14/22 0015 119/68 98.1 °F (36.7 °C) Oral 74 15 96 % --   11/13/22 2200 -- -- -- -- 16 -- --   11/13/22 1930 138/68 97.9 °F (36.6 °C) Oral 71 16 96 % --   11/13/22 1630 (!) 123/50 97.7 °F (36.5 °C) Oral 76 15 97 % --   11/13/22 1124 114/65 98 °F (36.7 °C) Axillary 70 -- 97 % --   11/13/22 1025 116/66 -- -- 70 -- -- --         Intake/Output Summary (Last 24 hours) at 11/14/2022 0949  Last data filed at 11/14/2022 0343  Gross per 24 hour   Intake 394.04 ml   Output 200 ml   Net 194.04 ml             Physical Exam  Vitals reviewed. Constitutional:       General: She is not in acute distress. Appearance: Normal appearance. She is ill-appearing. HENT:      Head: Normocephalic and atraumatic.       Right Ear: External ear normal.      Left Ear: External ear normal.      Nose: Nose normal.      Mouth/Throat:      Mouth: Mucous membranes are moist. Mucous membranes are not dry. Eyes:      General: No scleral icterus. Conjunctiva/sclera: Conjunctivae normal.   Neck:      Vascular: No JVD. Cardiovascular:      Rate and Rhythm: Normal rate and regular rhythm. Heart sounds: S1 normal and S2 normal.   Pulmonary:      Effort: Respiratory distress (mild) present. Breath sounds: Rhonchi present. Abdominal:      General: Bowel sounds are normal. There is distension. Tenderness: There is no abdominal tenderness. Musculoskeletal:         General: Swelling present. No deformity. Cervical back: Normal range of motion and neck supple. Skin:     General: Skin is dry. Coloration: Skin is not jaundiced. Neurological:      Mental Status: She is alert and oriented to person, place, and time. Mental status is at baseline. Psychiatric:         Mood and Affect: Mood normal.         Behavior: Behavior normal.        =======================================================================================     DATA:  Diagnostic tests reviewed by me for today's visit:   (AS NEEDED FOR MY EVALUATION AND MANAGEMENT).        Recent Labs     11/11/22  1741 11/12/22  0110 11/12/22  0433 11/13/22  0436 11/14/22  0539   WBC 20.6* 18.5* 18.0* 10.5 7.5   HCT 48.8* 46.1 44.2 40.1 38.7   * 460* 437 335 305       Iron Saturation:  No components found for: PERCENTFE  FERRITIN:  No results found for: FERRITIN  IRON:    Lab Results   Component Value Date/Time    IRON 54 11/09/2020 11:05 AM     TIBC:    Lab Results   Component Value Date/Time    TIBC 216 11/09/2020 11:05 AM       Recent Labs     11/11/22  1855 11/12/22  0433 11/13/22  0436 11/14/22  0539    144 142 142   K 4.3 3.5 3.8 3.3*   * 112* 111* 109   CO2 16* 18* 19* 22   BUN 37* 40* 48* 52*   CREATININE 1.3* 1.3* 1.6* 1.8*       Recent Labs     11/11/22  8615 11/12/22  0433 11/13/22  0436 11/14/22  0539   CALCIUM 9.4 9.1 8.8 8.9   MG  --  2.20 2.30 2.20   PHOS  --   --  2.8 3.2       No results for input(s): PH, PCO2, PO2 in the last 72 hours.     Invalid input(s): Kimberli Promise    ABG:  No results found for: PH, PCO2, PO2, HCO3, BE, THGB, TCO2, O2SAT  VBG:    Lab Results   Component Value Date/Time    PHVEN 7.303 09/15/2022 11:04 AM    LAE9LHV 38.8 09/15/2022 11:04 AM    BEVEN -6.7 09/15/2022 11:04 AM    L5WVZSLJ 97 09/15/2022 11:04 AM       LDH:  No results found for: LDH  Uric Acid:    Lab Results   Component Value Date/Time    LABURIC 4.8 05/28/2019 12:31 PM       PT/INR:    Lab Results   Component Value Date/Time    PROTIME 15.9 11/12/2022 02:35 AM    INR 1.27 11/12/2022 02:35 AM     Warfarin PT/INR:  No components found for: PTPATWAR, PTINRWAR  PTT:    Lab Results   Component Value Date/Time    APTT 42.5 01/22/2022 02:16 PM   [APTT}  Last 3 Troponin:    Lab Results   Component Value Date/Time    TROPONINI 0.09 11/13/2022 12:15 PM    TROPONINI 0.11 11/12/2022 12:11 PM    TROPONINI 0.17 11/12/2022 04:33 AM       U/A:    Lab Results   Component Value Date/Time    COLORU Yellow 11/12/2022 06:06 PM    PROTEINU Negative 11/12/2022 06:06 PM    PHUR 5.0 11/12/2022 06:06 PM    WBCUA 2 11/12/2022 06:06 PM    RBCUA 1 11/12/2022 06:06 PM    YEAST neg 03/09/2021 02:00 PM    BACTERIA None Seen 11/12/2022 06:06 PM    CLARITYU Clear 11/12/2022 06:06 PM    SPECGRAV 1.020 11/12/2022 06:06 PM    LEUKOCYTESUR TRACE 11/12/2022 06:06 PM    UROBILINOGEN 0.2 11/12/2022 06:06 PM    BILIRUBINUR Negative 11/12/2022 06:06 PM    BILIRUBINUR NEGATIVE 12/04/2011 06:40 PM    BLOODU TRACE 11/12/2022 06:06 PM    GLUCOSEU Negative 11/12/2022 06:06 PM    GLUCOSEU NEGATIVE 12/04/2011 06:40 PM     Microalbumen/Creatinine ratio:  No components found for: RUCREAT  24 Hour Urine for Protein:  No components found for: RAWUPRO, UHRS3, OXBJ70KN, UTV3  24 Hour Urine for Creatinine Clearance:  No components found for: CREAT4, UHRS10, UTV10  Urine Toxicology:  No components found for: Jona Gemma, IBENZO, ICOCAINE, IMARTHC, IOPIATES, IPHENCYC    HgBA1c:    Lab Results   Component Value Date/Time    LABA1C 5.5 01/23/2022 04:22 AM     RPR:  No results found for: RPR  HIV:  No results found for: HIV  REMY:  No results found for: ANATITER, REMY  RF:  No results found for: RF  DSDNA:  No components found for: DNA  AMYLASE:  No results found for: AMYLASE  LIPASE:    Lab Results   Component Value Date/Time    LIPASE 25.0 09/14/2022 07:11 PM     Fibrinogen Level:  No components found for: FIB       BELOW MENTIONED RADIOLOGY STUDY RESULTS BY ME (AS NEEDED FOR MY EVALUATION AND MANAGEMENT). XR CHEST PORTABLE    Result Date: 11/11/2022  EXAMINATION: ONE X-RAY VIEW OF THE CHEST 11/11/2022 5:33 pm COMPARISON: September 10, 2022 HISTORY: ORDERING SYSTEM PROVIDED HISTORY:  Chest pain no fever or cough. TECHNOLOGIST PROVIDED HISTORY: Reason for Exam:  Chest pain no fever or cough. FINDINGS: The cardiomediastinal silhouette is markedly enlarged. Pacer leads are stable in position. Vascular congestive changes are present with mild interstitial pulmonary edema. No evidence of lobar infiltrate. No pleural effusion or pneumothorax. Scarring at the left costophrenic angle is grossly stable. Severe cardiomegaly, with vascular congestive changes and mild interstitial pulmonary edema. CT CHEST PULMONARY EMBOLISM W CONTRAST    Result Date: 11/11/2022  EXAMINATION: CTA OF THE CHEST 11/11/2022 7:53 pm TECHNIQUE: CTA of the chest was performed after the administration of intravenous contrast.  Multiplanar reformatted images are provided for review. MIP images are provided for review. Automated exposure control, iterative reconstruction, and/or weight based adjustment of the mA/kV was utilized to reduce the radiation dose to as low as reasonably achievable.  COMPARISON: 09/15/2022, and 05/18/2021 HISTORY: [address]ley PROVIDED HISTORY: chest pain, dyspnea TECHNOLOGIST PROVIDED HISTORY: Reason for exam:->chest pain, dyspnea Decision Support Exception - unselect if not a suspected or confirmed emergency medical condition->Emergency Medical Condition (MA) Reason for Exam: chest pain, dyspnea Relevant Medical/Surgical History: Shortness of Breath (From home by [facility]. Had 2 stents placed this week) FINDINGS: Pulmonary Arteries: The pulmonary arteries are adequately opacified for evaluation. Evaluation is limited by respiratory motion. There is no evidence of acute pulmonary embolus to the segmental level. Stable enlargement of the main pulmonary artery which can be seen in the setting of pulmonary artery hypertension. Mediastinum: Diffuse ectasia of the ascending aorta measuring up to 4.2 cm may be mildly increased since the comparison study when it measured up to 3.8 cm. Unchanged cardiomegaly with an implanted cardiac device in place. Coronary artery disease. The patient is status post median sternotomy. Moderate calcified atherosclerotic plaque. A left atrial appendage Watchman device is in place. Mildly prominent right hilar lymph nodes are not significantly changed. No lymphadenopathy by size criteria. Lungs/pleura: Small right and trace left pleural effusions. Left pleural calcifications which could indicate asbestos related pleural disease. Areas of bilateral septal thickening and perihilar ground-glass attenuation, more prominent on the right and most likely reflective of pulmonary edema. No pneumothorax. Scattered calcified granulomas. Within the limitations of respiratory motion, there are no suspicious pulmonary nodules. Upper Abdomen: Limited images of the upper abdomen demonstrate reflux of contrast into the hepatic veins which can be seen in the setting of right heart failure. No acute upper abdominal findings. Soft Tissues/Bones: No acute bone or soft tissue abnormality.      1. Motion limited evaluation with no evidence of acute pulmonary embolism to the segmental level. 2. Pulmonary edema with small right and trace left pleural effusions. 3. Reflux of contrast into the hepatic veins can be seen in the setting of right heart failure. 4. Unchanged enlargement of main pulmonary artery which can be seen in the setting of pulmonary artery hypertension. 5. Chronic findings outlined in body of the report. This report was transcribed using voice recognition software, mainly. So please excuse brevity and/or typos. Every effort was made to ensure accuracy, however, inadvertent computerized transcription errors may be present. Please contact us, if any questions or clarifications are needed.

## 2024-07-21 NOTE — PATIENT INSTRUCTIONS
I would like you to follow up in 1 months  Please have all labs that were ordered done at least 1 week prior to your visit.    I recommend RSV vaccine at the pharmacy.

## 2024-07-22 ENCOUNTER — APPOINTMENT (OUTPATIENT)
Dept: PRIMARY CARE | Facility: CLINIC | Age: 84
End: 2024-07-22
Payer: MEDICARE

## 2024-07-22 VITALS
OXYGEN SATURATION: 96 % | SYSTOLIC BLOOD PRESSURE: 119 MMHG | DIASTOLIC BLOOD PRESSURE: 79 MMHG | HEART RATE: 83 BPM | RESPIRATION RATE: 16 BRPM | HEIGHT: 65 IN | WEIGHT: 145.2 LBS | TEMPERATURE: 98.1 F | BODY MASS INDEX: 24.19 KG/M2

## 2024-07-22 DIAGNOSIS — M79.89 OTHER SPECIFIED SOFT TISSUE DISORDERS: ICD-10-CM

## 2024-07-22 DIAGNOSIS — M71.20 SYNOVIAL CYST OF POPLITEAL SPACE, UNSPECIFIED LATERALITY: Primary | ICD-10-CM

## 2024-07-22 PROCEDURE — 99213 OFFICE O/P EST LOW 20 MIN: CPT | Performed by: FAMILY MEDICINE

## 2024-07-22 PROCEDURE — 1125F AMNT PAIN NOTED PAIN PRSNT: CPT | Performed by: FAMILY MEDICINE

## 2024-07-22 PROCEDURE — 3074F SYST BP LT 130 MM HG: CPT | Performed by: FAMILY MEDICINE

## 2024-07-22 PROCEDURE — 1159F MED LIST DOCD IN RCRD: CPT | Performed by: FAMILY MEDICINE

## 2024-07-22 PROCEDURE — 1123F ACP DISCUSS/DSCN MKR DOCD: CPT | Performed by: FAMILY MEDICINE

## 2024-07-22 PROCEDURE — 3078F DIAST BP <80 MM HG: CPT | Performed by: FAMILY MEDICINE

## 2024-07-22 PROCEDURE — 1160F RVW MEDS BY RX/DR IN RCRD: CPT | Performed by: FAMILY MEDICINE

## 2024-07-22 PROCEDURE — 1036F TOBACCO NON-USER: CPT | Performed by: FAMILY MEDICINE

## 2024-07-22 ASSESSMENT — PAIN SCALES - GENERAL: PAINLEVEL: 6

## 2024-07-26 ENCOUNTER — APPOINTMENT (OUTPATIENT)
Dept: PRIMARY CARE | Facility: CLINIC | Age: 84
End: 2024-07-26
Payer: MEDICARE

## 2024-08-02 ENCOUNTER — PATIENT MESSAGE (OUTPATIENT)
Dept: PRIMARY CARE | Facility: CLINIC | Age: 84
End: 2024-08-02
Payer: MEDICARE

## 2024-08-02 DIAGNOSIS — E78.2 MIXED HYPERLIPIDEMIA: ICD-10-CM

## 2024-08-02 RX ORDER — ATORVASTATIN CALCIUM 40 MG/1
60 TABLET, FILM COATED ORAL
Qty: 135 TABLET | Refills: 1 | Status: SHIPPED | OUTPATIENT
Start: 2024-08-02

## 2024-08-06 ENCOUNTER — APPOINTMENT (OUTPATIENT)
Dept: ORTHOPEDIC SURGERY | Facility: CLINIC | Age: 84
End: 2024-08-06
Payer: MEDICARE

## 2024-08-14 ENCOUNTER — LAB (OUTPATIENT)
Dept: LAB | Facility: LAB | Age: 84
End: 2024-08-14
Payer: MEDICARE

## 2024-08-14 DIAGNOSIS — E11.51 TYPE 2 DIABETES MELLITUS WITH DIABETIC PERIPHERAL ANGIOPATHY WITHOUT GANGRENE, WITHOUT LONG-TERM CURRENT USE OF INSULIN (MULTI): ICD-10-CM

## 2024-08-14 DIAGNOSIS — E78.2 MIXED HYPERLIPIDEMIA: ICD-10-CM

## 2024-08-14 DIAGNOSIS — I10 PRIMARY HYPERTENSION: ICD-10-CM

## 2024-08-14 PROBLEM — E11.29 TYPE 2 DIABETES MELLITUS WITH DIABETIC MICROALBUMINURIA, WITHOUT LONG-TERM CURRENT USE OF INSULIN (MULTI): Status: ACTIVE | Noted: 2022-06-08

## 2024-08-14 PROBLEM — R80.9 TYPE 2 DIABETES MELLITUS WITH DIABETIC MICROALBUMINURIA, WITHOUT LONG-TERM CURRENT USE OF INSULIN (MULTI): Status: ACTIVE | Noted: 2022-06-08

## 2024-08-14 PROBLEM — N18.2 CKD (CHRONIC KIDNEY DISEASE) STAGE 2, GFR 60-89 ML/MIN: Status: ACTIVE | Noted: 2024-08-14

## 2024-08-14 LAB
ALBUMIN SERPL BCP-MCNC: 3.7 G/DL (ref 3.4–5)
ALP SERPL-CCNC: 58 U/L (ref 33–136)
ALT SERPL W P-5'-P-CCNC: 16 U/L (ref 7–45)
ANION GAP SERPL CALC-SCNC: 8 MMOL/L (ref 10–20)
AST SERPL W P-5'-P-CCNC: 14 U/L (ref 9–39)
BILIRUB SERPL-MCNC: 0.4 MG/DL (ref 0–1.2)
BUN SERPL-MCNC: 20 MG/DL (ref 6–23)
CALCIUM SERPL-MCNC: 9 MG/DL (ref 8.6–10.3)
CHLORIDE SERPL-SCNC: 102 MMOL/L (ref 98–107)
CHOLEST SERPL-MCNC: 142 MG/DL (ref 0–199)
CHOLESTEROL/HDL RATIO: 3.3
CO2 SERPL-SCNC: 32 MMOL/L (ref 21–32)
CREAT SERPL-MCNC: 0.83 MG/DL (ref 0.5–1.05)
CREAT UR-MCNC: 59.2 MG/DL (ref 20–320)
EGFRCR SERPLBLD CKD-EPI 2021: 71 ML/MIN/1.73M*2
EST. AVERAGE GLUCOSE BLD GHB EST-MCNC: 154 MG/DL
GLUCOSE SERPL-MCNC: 123 MG/DL (ref 74–99)
HBA1C MFR BLD: 7 %
HDLC SERPL-MCNC: 43 MG/DL
LDLC SERPL CALC-MCNC: 73 MG/DL
LDLC SERPL DIRECT ASSAY-MCNC: 81 MG/DL (ref 0–129)
MICROALBUMIN UR-MCNC: 19.6 MG/L
MICROALBUMIN/CREAT UR: 33.1 UG/MG CREAT
NON HDL CHOLESTEROL: 99 MG/DL (ref 0–149)
POTASSIUM SERPL-SCNC: 4 MMOL/L (ref 3.5–5.3)
PROT SERPL-MCNC: 5.8 G/DL (ref 6.4–8.2)
SODIUM SERPL-SCNC: 138 MMOL/L (ref 136–145)
TRIGL SERPL-MCNC: 132 MG/DL (ref 0–149)
VLDL: 26 MG/DL (ref 0–40)

## 2024-08-14 PROCEDURE — 82043 UR ALBUMIN QUANTITATIVE: CPT

## 2024-08-14 PROCEDURE — 83036 HEMOGLOBIN GLYCOSYLATED A1C: CPT

## 2024-08-14 PROCEDURE — 83721 ASSAY OF BLOOD LIPOPROTEIN: CPT

## 2024-08-14 PROCEDURE — 36415 COLL VENOUS BLD VENIPUNCTURE: CPT

## 2024-08-14 PROCEDURE — 80053 COMPREHEN METABOLIC PANEL: CPT

## 2024-08-14 PROCEDURE — 82570 ASSAY OF URINE CREATININE: CPT

## 2024-08-14 PROCEDURE — 80061 LIPID PANEL: CPT

## 2024-08-15 ENCOUNTER — APPOINTMENT (OUTPATIENT)
Dept: VASCULAR MEDICINE | Facility: HOSPITAL | Age: 84
End: 2024-08-15
Payer: MEDICARE

## 2024-08-15 ENCOUNTER — HOSPITAL ENCOUNTER (OUTPATIENT)
Dept: RADIOLOGY | Facility: HOSPITAL | Age: 84
Discharge: HOME | End: 2024-08-15
Payer: MEDICARE

## 2024-08-15 DIAGNOSIS — M71.20 SYNOVIAL CYST OF POPLITEAL SPACE, UNSPECIFIED LATERALITY: ICD-10-CM

## 2024-08-15 DIAGNOSIS — M79.89 OTHER SPECIFIED SOFT TISSUE DISORDERS: ICD-10-CM

## 2024-08-15 PROCEDURE — 76882 US LMTD JT/FCL EVL NVASC XTR: CPT

## 2024-08-17 NOTE — ASSESSMENT & PLAN NOTE
Medical Wellness exam done.  DEXA ordered 1/24  Boostrix 7/23  Pneumovax 23 12/07  Prevnar 13 3/15  Nonsmoker  Not on opioids  Depression Screening  Depression screening completed using the PHQ-2 questions, with results documented in the chart/encounter (~5min).  (See Rooming Screening section for documentation, and/or progress note for additional information)

## 2024-08-17 NOTE — ASSESSMENT & PLAN NOTE
GFR stable   Mild microalbuminuria   Consider SGLT-2  On ARB  Low salt diet  Recheck 6 months

## 2024-08-17 NOTE — PATIENT INSTRUCTIONS
I recommend RSV vaccine, Shingrix, and new COVID booster at the pharmacy.    I recommend yearly dilated eye exam with your ophthalmologist or optometrist.    I would like you to follow up in 6 months  Please have all labs that were ordered done at least 1 week prior to your visit.

## 2024-08-17 NOTE — ASSESSMENT & PLAN NOTE
A1c stable at 7.0  Mild microalbuminuria   Start SGLT-2  Risks, benefits and side effects reviewed with patient - pharm referral done  On statin, ARB  Work on diet reviewed with patient.   Recheck 6 months

## 2024-08-17 NOTE — PROGRESS NOTES
Subjective :  Chief Complaint: Olga Denis is an 83 y.o. female here for an annual Medicare Wellness visit.    Patient feels well. No other complaints or concerns.    I have reviewed and reconciled the medication list with the patient today.    Current Outpatient Medications:     atorvastatin (Lipitor) 40 mg tablet, Take 1.5 tablets (60 mg) by mouth once daily., Disp: 135 tablet, Rfl: 3    hydroCHLOROthiazide (HYDRODiuril) 25 mg tablet, Take 1 tablet (25 mg) by mouth once daily., Disp: 90 tablet, Rfl: 3    losartan (Cozaar) 100 mg tablet, Take 1 tablet (100 mg) by mouth once daily., Disp: 90 tablet, Rfl: 3    metFORMIN (Glucophage) 500 mg tablet, Take 1 tablet (500 mg) by mouth 2 times a day., Disp: 180 tablet, Rfl: 3    metoprolol succinate XL (Toprol-XL) 25 mg 24 hr tablet, Take 0.5 tablets (12.5 mg) by mouth once daily., Disp: 45 tablet, Rfl: 3    omeprazole (PriLOSEC) 20 mg DR capsule, Take 1 capsule (20 mg) by mouth once daily., Disp: 90 capsule, Rfl: 3    The patient's relevant past medical, surgical, family and social history was reviewed in [facility].  All pertinent lab work and results for this visit were reviewed with patient.    Lab on 08/14/2024   Component Date Value Ref Range Status    LDL, Direct 08/14/2024 81  0 - 129 mg/dL Final    Cholesterol 08/14/2024 142  0 - 199 mg/dL Final          Age      Desirable   Borderline High   High     0-19 Y     0 - 169       170 - 199     >/= 200    20-24 Y     0 - 189       190 - 224     >/= 225         >24 Y     0 - 199       200 - 239     >/= 240   **All ranges are based on fasting samples. Specific   therapeutic targets will vary based on patient-specific   cardiac risk.    Pediatric guidelines reference:Pediatrics 2011, 128(S5).Adult guidelines reference: NCEP ATPIII Guidelines,PAVEL 2001, 258:2486-97    Venipuncture immediately after or during the administration of Metamizole may lead to falsely low results. Testing should be performed immediately prior  to Metamizole dosing.    HDL-Cholesterol 08/14/2024 43.0  mg/dL Final      Age       Very Low   Low     Normal    High    0-19 Y    < 35      < 40     40-45     ----  20-24 Y    ----     < 40      >45      ----        >24 Y      ----     < 40     40-60      >60      Cholesterol/HDL Ratio 08/14/2024 3.3   Final      Ref Values  Desirable  < 3.4  High Risk  > 5.0    LDL Calculated 08/14/2024 73  <=99 mg/dL Final                                Near   Borderline      AGE      Desirable  Optimal    High     High     Very High     0-19 Y     0 - 109     ---    110-129   >/= 130     ----    20-24 Y     0 - 119     ---    120-159   >/= 160     ----      >24 Y     0 -  99   100-129  130-159   160-189     >/=190      VLDL 08/14/2024 26  0 - 40 mg/dL Final    Triglycerides 08/14/2024 132  0 - 149 mg/dL Final       Age         Desirable   Borderline High   High     Very High   0 D-90 D    19 - 174         ----         ----        ----  91 D- 9 Y     0 -  74        75 -  99     >/= 100      ----    10-19 Y     0 -  89        90 - 129     >/= 130      ----    20-24 Y     0 - 114       115 - 149     >/= 150      ----         >24 Y     0 - 149       150 - 199    200- 499    >/= 500    Venipuncture immediately after or during the administration of Metamizole may lead to falsely low results. Testing should be performed immediately prior to Metamizole dosing.    Non HDL Cholesterol 08/14/2024 99  0 - 149 mg/dL Final          Age       Desirable   Borderline High   High     Very High     0-19 Y     0 - 119       120 - 144     >/= 145    >/= 160    20-24 Y     0 - 149       150 - 189     >/= 190      ----         >24 Y    30 mg/dL above LDL Cholesterol goal      Glucose 08/14/2024 123 (H)  74 - 99 mg/dL Final    Sodium 08/14/2024 138  136 - 145 mmol/L Final    Potassium 08/14/2024 4.0  3.5 - 5.3 mmol/L Final    Chloride 08/14/2024 102  98 - 107 mmol/L Final    Bicarbonate 08/14/2024 32  21 - 32 mmol/L Final    Anion Gap 08/14/2024 8 (L)  " 10 - 20 mmol/L Final    Urea Nitrogen 08/14/2024 20  6 - 23 mg/dL Final    Creatinine 08/14/2024 0.83  0.50 - 1.05 mg/dL Final    eGFR 08/14/2024 71  >60 mL/min/1.73m*2 Final    Calculations of estimated GFR are performed using the 2021 CKD-EPI Study Refit equation without the race variable for the IDMS-Traceable creatinine methods.  https://jasn.asnjournals.org/content/early/2021/09/22/ASN.2643561525    Calcium 08/14/2024 9.0  8.6 - 10.3 mg/dL Final    Albumin 08/14/2024 3.7  3.4 - 5.0 g/dL Final    Alkaline Phosphatase 08/14/2024 58  33 - 136 U/L Final    Total Protein 08/14/2024 5.8 (L)  6.4 - 8.2 g/dL Final    AST 08/14/2024 14  9 - 39 U/L Final    Bilirubin, Total 08/14/2024 0.4  0.0 - 1.2 mg/dL Final    ALT 08/14/2024 16  7 - 45 U/L Final    Patients treated with Sulfasalazine may generate falsely decreased results for ALT.    Albumin, Urine Random 08/14/2024 19.6  Not established mg/L Final    Creatinine, Urine Random 08/14/2024 59.2  20.0 - 320.0 mg/dL Final    Albumin/Creatinine Ratio 08/14/2024 33.1 (H)  <30.0 ug/mg Creat Final    Hemoglobin A1C 08/14/2024 7.0 (H)  see below % Final    Estimated Average Glucose 08/14/2024 154  Not Established mg/dL Final         Review of Systems   A complete review of systems was performed and all systems were normal except what is noted in the HPI.      List of current healthcare providers:  Patient Care Team:  Mamie Mane MD as PCP - General  LISANDRO Barrientos [facility] P[facility]        Over the past 2 weeks, how often have you been bothered by any of the following problems?  Little interest or pleasure in doing things: Not at all  Feeling down, depressed, or hopeless: Not at all  Patient Health Questionnaire-2 Score: 0             Advance Care Planning:    Living Will: Yes  POA: Yes    Objective :  /84   Pulse 58   Temp 36.1 °C (97 °F)   Ht 1.651 m (5' 5\")   Wt 67.5 kg (148 lb 12.8 oz)   LMP  (LMP Unknown)   SpO2 98%   BMI " 24.76 kg/m²    No results found.  Physical Exam  Constitutional:       Appearance: Normal appearance.   HENT:      Head: Normocephalic and atraumatic.   Neck:      Vascular: No carotid bruit.   Cardiovascular:      Rate and Rhythm: Normal rate and regular rhythm.      Heart sounds: Normal heart sounds.   Pulmonary:      Effort: Pulmonary effort is normal.      Breath sounds: Normal breath sounds. No wheezing, rhonchi or rales.   Abdominal:      General: Abdomen is flat. Bowel sounds are normal.      Palpations: Abdomen is soft.      Tenderness: There is no abdominal tenderness. There is no guarding.   Musculoskeletal:         General: Normal range of motion.      Right lower leg: No edema.      Left lower leg: No edema.   Skin:     General: Skin is dry.   Neurological:      General: No focal deficit present.      Mental Status: She is alert and oriented to person, place, and time.   Psychiatric:         Mood and Affect: Mood normal.         Behavior: Behavior normal.         Thought Content: Thought content normal.         Assessment/Plan :  Problem List Items Addressed This Visit       Gastroesophageal reflux disease    Relevant Medications    omeprazole (PriLOSEC) 20 mg DR capsule    Mixed hyperlipidemia     Well controlled. Continue current medicine and recheck in 6 months.          Relevant Medications    atorvastatin (Lipitor) 40 mg tablet    Other Relevant Orders    TSH with reflex to Free T4 if abnormal    Lipid Panel    Cholesterol, LDL Direct    Primary hypertension     Well controlled. Continue current medicine and recheck in 6 months.          Relevant Medications    hydroCHLOROthiazide (HYDRODiuril) 25 mg tablet    losartan (Cozaar) 100 mg tablet    Other Relevant Orders    TSH with reflex to Free T4 if abnormal    Comprehensive Metabolic Panel    Type 2 diabetes mellitus with diabetic microalbuminuria, without long-term current use of insulin (Multi)     A1c stable at 7.0  Mild microalbuminuria   Start  SGLT-2  Risks, benefits and side effects reviewed with patient - pharm referral done  On statin, ARB  Work on diet reviewed with patient.   Recheck 6 months              Relevant Medications    metFORMIN (Glucophage) 500 mg tablet    Other Relevant Orders    TSH with reflex to Free T4 if abnormal    Comprehensive Metabolic Panel    Hemoglobin A1C    Vitamin B12    Referral to Clinical Pharmacy    Medicare annual wellness visit, subsequent - Primary     Medical Wellness exam done.  DEXA ordered 1/24  Boostrix 7/23  Pneumovax 23 12/07  Prevnar 13 3/15  Nonsmoker  Not on opioids  Depression Screening  Depression screening completed using the PHQ-2 questions, with results documented in the chart/encounter (~5min).  (See Rooming Screening section for documentation, and/or progress note for additional information)           Peripheral arterial disease (CMS-HCC)     asymptomatic and stable  On statin  Has follow up cardiology 1/25         Baker's cyst     Has follow up ortho 8/20/24         CKD (chronic kidney disease) stage 2, GFR 60-89 ml/min     GFR stable   Mild microalbuminuria   Consider SGLT-2  On ARB  Low salt diet  Recheck 6 months              Relevant Orders    CBC    Comprehensive Metabolic Panel    Referral to Clinical Pharmacy     Other Visit Diagnoses       Type 2 diabetes mellitus with other specified complication, unspecified whether long term insulin use (Multi)        Relevant Medications    metFORMIN (Glucophage) 500 mg tablet    Chest pain, unspecified type        Stress test ordered   follow up 1 month    Relevant Medications    metoprolol succinate XL (Toprol-XL) 25 mg 24 hr tablet               The following health maintenance schedule was reviewed with the patient and provided in printed form in the after visit summary:  Health Maintenance   Topic Date Due    Bone Density Scan  Never done    RSV Pregnant patients and/or  patients aged 60+ years (1 - 1-dose 60+ series) Never done    COVID-19 Vaccine  (5 - 2023-24 season) 03/02/2024    Diabetes: Retinopathy Screening  03/10/2024    Influenza Vaccine (1) 09/01/2024    Diabetes: Hemoglobin A1C  11/14/2024    Lipid Panel  08/14/2025    Diabetes: Urine Protein Screening  08/14/2025    Medicare Annual Wellness Visit (AWV)  08/20/2025    DTaP/Tdap/Td Vaccines (3 - Td or Tdap) 07/11/2033    Pneumococcal Vaccine: 65+ Years  Completed    HIB Vaccines  Aged Out    Hepatitis B Vaccines  Aged Out    IPV Vaccines  Aged Out    Hepatitis A Vaccines  Aged Out    Meningococcal Vaccine  Aged Out    Rotavirus Vaccines  Aged Out    HPV Vaccines  Aged Out    Zoster Vaccines  Discontinued    Irritable Bowel Syndrome  Discontinued           Patient understands and agrees with treatment plan.          Mamie Mane MD

## 2024-08-19 ENCOUNTER — APPOINTMENT (OUTPATIENT)
Dept: PRIMARY CARE | Facility: CLINIC | Age: 84
End: 2024-08-19
Payer: MEDICARE

## 2024-08-19 VITALS
SYSTOLIC BLOOD PRESSURE: 130 MMHG | BODY MASS INDEX: 24.79 KG/M2 | OXYGEN SATURATION: 98 % | WEIGHT: 148.8 LBS | HEIGHT: 65 IN | DIASTOLIC BLOOD PRESSURE: 84 MMHG | HEART RATE: 58 BPM | TEMPERATURE: 97 F

## 2024-08-19 DIAGNOSIS — E11.29 TYPE 2 DIABETES MELLITUS WITH DIABETIC MICROALBUMINURIA, WITHOUT LONG-TERM CURRENT USE OF INSULIN (MULTI): ICD-10-CM

## 2024-08-19 DIAGNOSIS — K21.9 GASTROESOPHAGEAL REFLUX DISEASE, UNSPECIFIED WHETHER ESOPHAGITIS PRESENT: ICD-10-CM

## 2024-08-19 DIAGNOSIS — M71.22 SYNOVIAL CYST OF LEFT POPLITEAL SPACE: ICD-10-CM

## 2024-08-19 DIAGNOSIS — R80.9 TYPE 2 DIABETES MELLITUS WITH DIABETIC MICROALBUMINURIA, WITHOUT LONG-TERM CURRENT USE OF INSULIN (MULTI): ICD-10-CM

## 2024-08-19 DIAGNOSIS — N18.2 CKD (CHRONIC KIDNEY DISEASE) STAGE 2, GFR 60-89 ML/MIN: ICD-10-CM

## 2024-08-19 DIAGNOSIS — I73.9 PERIPHERAL ARTERIAL DISEASE (CMS-HCC): ICD-10-CM

## 2024-08-19 DIAGNOSIS — I10 PRIMARY HYPERTENSION: ICD-10-CM

## 2024-08-19 DIAGNOSIS — E11.69 TYPE 2 DIABETES MELLITUS WITH OTHER SPECIFIED COMPLICATION, UNSPECIFIED WHETHER LONG TERM INSULIN USE (MULTI): ICD-10-CM

## 2024-08-19 DIAGNOSIS — Z00.00 MEDICARE ANNUAL WELLNESS VISIT, SUBSEQUENT: Primary | ICD-10-CM

## 2024-08-19 DIAGNOSIS — E78.2 MIXED HYPERLIPIDEMIA: ICD-10-CM

## 2024-08-19 DIAGNOSIS — R07.9 CHEST PAIN, UNSPECIFIED TYPE: ICD-10-CM

## 2024-08-19 PROCEDURE — 3075F SYST BP GE 130 - 139MM HG: CPT | Performed by: FAMILY MEDICINE

## 2024-08-19 PROCEDURE — 1159F MED LIST DOCD IN RCRD: CPT | Performed by: FAMILY MEDICINE

## 2024-08-19 PROCEDURE — 1158F ADVNC CARE PLAN TLK DOCD: CPT | Performed by: FAMILY MEDICINE

## 2024-08-19 PROCEDURE — G0444 DEPRESSION SCREEN ANNUAL: HCPCS | Performed by: FAMILY MEDICINE

## 2024-08-19 PROCEDURE — 1036F TOBACCO NON-USER: CPT | Performed by: FAMILY MEDICINE

## 2024-08-19 PROCEDURE — 1123F ACP DISCUSS/DSCN MKR DOCD: CPT | Performed by: FAMILY MEDICINE

## 2024-08-19 PROCEDURE — G0439 PPPS, SUBSEQ VISIT: HCPCS | Performed by: FAMILY MEDICINE

## 2024-08-19 PROCEDURE — 1170F FXNL STATUS ASSESSED: CPT | Performed by: FAMILY MEDICINE

## 2024-08-19 PROCEDURE — 99214 OFFICE O/P EST MOD 30 MIN: CPT | Performed by: FAMILY MEDICINE

## 2024-08-19 PROCEDURE — 3079F DIAST BP 80-89 MM HG: CPT | Performed by: FAMILY MEDICINE

## 2024-08-19 PROCEDURE — 1160F RVW MEDS BY RX/DR IN RCRD: CPT | Performed by: FAMILY MEDICINE

## 2024-08-19 RX ORDER — LOSARTAN POTASSIUM 100 MG/1
100 TABLET ORAL DAILY
Qty: 90 TABLET | Refills: 3 | Status: SHIPPED | OUTPATIENT
Start: 2024-08-19

## 2024-08-19 RX ORDER — ATORVASTATIN CALCIUM 40 MG/1
60 TABLET, FILM COATED ORAL
Qty: 135 TABLET | Refills: 3 | Status: SHIPPED | OUTPATIENT
Start: 2024-08-19 | End: 2025-08-19

## 2024-08-19 RX ORDER — HYDROCHLOROTHIAZIDE 25 MG/1
25 TABLET ORAL DAILY
Qty: 90 TABLET | Refills: 3 | Status: SHIPPED | OUTPATIENT
Start: 2024-08-19

## 2024-08-19 RX ORDER — OMEPRAZOLE 20 MG/1
20 CAPSULE, DELAYED RELEASE ORAL
Qty: 90 CAPSULE | Refills: 3 | Status: SHIPPED | OUTPATIENT
Start: 2024-08-19

## 2024-08-19 RX ORDER — METOPROLOL SUCCINATE 25 MG/1
12.5 TABLET, EXTENDED RELEASE ORAL DAILY
Qty: 45 TABLET | Refills: 3 | Status: SHIPPED | OUTPATIENT
Start: 2024-08-19 | End: 2025-08-19

## 2024-08-19 RX ORDER — METFORMIN HYDROCHLORIDE 500 MG/1
500 TABLET ORAL 2 TIMES DAILY
Qty: 180 TABLET | Refills: 3 | Status: SHIPPED | OUTPATIENT
Start: 2024-08-19

## 2024-08-19 ASSESSMENT — ACTIVITIES OF DAILY LIVING (ADL)
BATHING: INDEPENDENT
TAKING_MEDICATION: INDEPENDENT
DOING_HOUSEWORK: INDEPENDENT
DRESSING: INDEPENDENT
MANAGING_FINANCES: INDEPENDENT
GROCERY_SHOPPING: INDEPENDENT

## 2024-08-19 ASSESSMENT — ENCOUNTER SYMPTOMS
OCCASIONAL FEELINGS OF UNSTEADINESS: 0
DEPRESSION: 0
LOSS OF SENSATION IN FEET: 0

## 2024-08-19 ASSESSMENT — PATIENT HEALTH QUESTIONNAIRE - PHQ9
1. LITTLE INTEREST OR PLEASURE IN DOING THINGS: NOT AT ALL
SUM OF ALL RESPONSES TO PHQ9 QUESTIONS 1 AND 2: 0
2. FEELING DOWN, DEPRESSED OR HOPELESS: NOT AT ALL

## 2024-08-20 ENCOUNTER — HOSPITAL ENCOUNTER (OUTPATIENT)
Dept: RADIOLOGY | Facility: CLINIC | Age: 84
Discharge: HOME | End: 2024-08-20
Payer: MEDICARE

## 2024-08-20 ENCOUNTER — APPOINTMENT (OUTPATIENT)
Dept: ORTHOPEDIC SURGERY | Facility: CLINIC | Age: 84
End: 2024-08-20
Payer: MEDICARE

## 2024-08-20 VITALS — WEIGHT: 149 LBS | BODY MASS INDEX: 24.83 KG/M2 | HEIGHT: 65 IN

## 2024-08-20 DIAGNOSIS — M71.20 SYNOVIAL CYST OF POPLITEAL SPACE, UNSPECIFIED LATERALITY: ICD-10-CM

## 2024-08-20 DIAGNOSIS — M17.12 PRIMARY OSTEOARTHRITIS OF LEFT KNEE: ICD-10-CM

## 2024-08-20 PROCEDURE — 73564 X-RAY EXAM KNEE 4 OR MORE: CPT | Mod: LT

## 2024-08-20 PROCEDURE — 73564 X-RAY EXAM KNEE 4 OR MORE: CPT | Mod: LEFT SIDE | Performed by: RADIOLOGY

## 2024-08-20 PROCEDURE — 1159F MED LIST DOCD IN RCRD: CPT | Performed by: ORTHOPAEDIC SURGERY

## 2024-08-20 PROCEDURE — 1123F ACP DISCUSS/DSCN MKR DOCD: CPT | Performed by: ORTHOPAEDIC SURGERY

## 2024-08-20 PROCEDURE — 1036F TOBACCO NON-USER: CPT | Performed by: ORTHOPAEDIC SURGERY

## 2024-08-20 PROCEDURE — 99214 OFFICE O/P EST MOD 30 MIN: CPT | Performed by: ORTHOPAEDIC SURGERY

## 2024-08-20 ASSESSMENT — PAIN - FUNCTIONAL ASSESSMENT: PAIN_FUNCTIONAL_ASSESSMENT: NO/DENIES PAIN

## 2024-08-20 NOTE — LETTER
August 20, 2024     Mamie Mane MD  [address], [address] [place] [address]    Patient: Olga Denis   YOB: 1940   Date of Visit: 8/20/2024       Dear Dr. Mamie Mane MD:    Thank you for referring Olga Denis to me for evaluation. Below are my notes for this consultation.  If you have questions, please do not hesitate to call me. I look forward to following your patient along with you.       Sincerely,     Juni Forbes, DO      CC: No Recipients  ______________________________________________________________________________________    PRIMARY CARE PHYSICIAN: Mamie Mane MD  REFERRING PROVIDER: Mamie Mane MD  [address], [address] [place] [address]     CONSULT ORTHOPAEDIC: Knee Evaluation        ASSESSMENT & PLAN    IMPRESSION:   1.  Left posterior knee pain  2.  Primary arthritis, left knee, mild    PLAN:   Discussed with the patient findings above.  She has minimal arthritic findings on her x-rays.  I do think some of her symptoms are due to some tightness and potentially some overuse and conditioning along with age with her hamstrings and her calf.  She is overall very active and has some home exercises.  Given that her symptoms are intermittent and not so much pain and just tightness recommended trying an augment to her home exercises with the current knee conditioning guide that we provided for her.  May potentially consider trying a corticosteroid junction in the future as her Baker's cyst found on ultrasound could potentially be from some mild arthritis in her knee but given that this does not seem to be significantly symptomatic we will continue to observe this and treat her conservatively.  Patient is agreed this plan of care.  She may continue activities as tolerated.  Will follow-up as needed.      SUBJECTIVE  CHIEF COMPLAINT:   Chief Complaint   Patient  presents with   • Left Knee - Pain        HPI: Olga Denis is a 83 y.o. patient. Olga Denis has had progressive problems with their left knee over the past 2 months. They do not report any constant or progressive numbness or tingling in their legs. Their symptoms are interfering with activities which include pain and stiffness in the back of the knee that sometimes radiates laterally to the lower leg. XR done today. Type 2 Diabetic    FUNCTIONAL STATUS: not limited.  AMBULATORY STATUS:  independent  PREVIOUS TREATMENTS: NSAIDS Advil with good improvement  HISTORY OF SURGERY ON AFFECTED KNEE(S): No       REVIEW OF SYSTEMS  Constitutional: See HPI for pain assessment, No significant weight loss, recent trauma  Cardiovascular: No chest pain, shortness of breath  Respiratory: No difficulty breathing, cough  Gastrointestinal: No nausea, vomiting, diarrhea, constipation  Musculoskeletal: Noted in HPI, positive for pain, restricted motion, stiffness  Integumentary: No rashes, easy bruising, redness   Neurological: no numbness or tingling in extremities, no gait disturbances   Psychiatric: No mood changes, memory changes, social issues  Heme/Lymph: no excessive swelling, easy bruising, excessive bleeding  ENT: No hearing changes  Eyes: No vision changes    Past Medical History:   Diagnosis Date   • Other nonspecific abnormal finding of lung field 12/22/2021    Abnormal screening computed tomography (CT) of lung   • Personal history of malignant melanoma of skin     History of malignant melanoma   • Personal history of other diseases of the circulatory system     History of hypertension   • Personal history of other diseases of the musculoskeletal system and connective tissue     History of osteopenia   • Personal history of other endocrine, nutritional and metabolic disease     History of hyperlipidemia   • Personal history of other endocrine, nutritional and metabolic disease     History of diabetes  mellitus   • Personal history of other specified conditions     History of dyspnea   • Personal history of other specified conditions 06/25/2020    History of diarrhea        Allergies   Allergen Reactions   • Ace Inhibitors Anaphylaxis   • Sulfa (Sulfonamide Antibiotics) Anaphylaxis and Unknown   • Amlodipine Swelling   • Meloxicam Diarrhea   • Olmesartan Swelling        Past Surgical History:   Procedure Laterality Date   • BLADDER SURGERY  05/02/2018    Bladder Surgery   • EYE SURGERY  05/02/2018    Eye Surgery   • HYSTERECTOMY  05/02/2018    Hysterectomy   • OTHER SURGICAL HISTORY  06/15/2022    Vascular surgical procedure   • SEPTOPLASTY  05/02/2018    Septoplasty   • TONSILLECTOMY  05/02/2018    Tonsillectomy        Family History   Problem Relation Name Age of Onset   • Breast cancer Mother     • Breast cancer Mother's Sister          Social History     Socioeconomic History   • Marital status:      Spouse name: Not on file   • Number of children: Not on file   • Years of education: Not on file   • Highest education level: Not on file   Occupational History   • Not on file   Tobacco Use   • Smoking status: Never   • Smokeless tobacco: Never   Vaping Use   • Vaping status: Never Used   Substance and Sexual Activity   • Alcohol use: Never   • Drug use: Never   • Sexual activity: Not on file   Other Topics Concern   • Not on file   Social History Narrative   • Not on file     Social Determinants of Health     Financial Resource Strain: Not on file   Food Insecurity: Not on file   Transportation Needs: Not on file   Physical Activity: Not on file   Stress: Not on file   Social Connections: Not on file   Intimate Partner Violence: Not on file   Housing Stability: Not on file        CURRENT MEDICATIONS:   Current Outpatient Medications   Medication Sig Dispense Refill   • atorvastatin (Lipitor) 40 mg tablet Take 1.5 tablets (60 mg) by mouth once daily. 135 tablet 3   • hydroCHLOROthiazide (HYDRODiuril) 25 mg  "tablet Take 1 tablet (25 mg) by mouth once daily. 90 tablet 3   • losartan (Cozaar) 100 mg tablet Take 1 tablet (100 mg) by mouth once daily. 90 tablet 3   • metFORMIN (Glucophage) 500 mg tablet Take 1 tablet (500 mg) by mouth 2 times a day. 180 tablet 3   • metoprolol succinate XL (Toprol-XL) 25 mg 24 hr tablet Take 0.5 tablets (12.5 mg) by mouth once daily. 45 tablet 3   • omeprazole (PriLOSEC) 20 mg DR capsule Take 1 capsule (20 mg) by mouth once daily. 90 capsule 3     No current facility-administered medications for this visit.        OBJECTIVE    PHYSICAL EXAM      1/2/2024     1:00 PM 1/24/2024     2:07 PM 7/15/2024    12:35 PM 7/22/2024     2:35 PM 8/19/2024     3:39 PM 8/19/2024     4:06 PM 8/20/2024    11:02 AM   Vitals   Systolic 128 124 120 119 152 130    Diastolic 78 77 72 79 81 84    Heart Rate 51 57 52 83 58     Temp  36.4 °C (97.6 °F)  36.7 °C (98.1 °F) 36.1 °C (97 °F)     Resp    16      Height (in) 1.651 m (5' 5\") 1.651 m (5' 5\") 1.651 m (5' 5\") 1.651 m (5' 5\") 1.651 m (5' 5\")  1.651 m (5' 5\")   Weight (lb) 144 149.4 150 145.2 148.8  149   BMI 23.96 kg/m2 24.86 kg/m2 24.96 kg/m2 24.16 kg/m2 24.76 kg/m2  24.79 kg/m2   BSA (m2) 1.73 m2 1.76 m2 1.77 m2 1.74 m2 1.76 m2  1.76 m2   Visit Report Report Report Report Report Report Report Report      Body mass index is 24.79 kg/m².    GENERAL: A/Ox3, NAD. Appears healthy, well nourished  PSYCHIATRIC: Mood stable, appropriate memory recall  EYES: EOM intact, no scleral icterus  CARDIAC: regular rate  LUNGS: Breathing non-labored  SKIN: no erythema, rashes, or ecchymoses     MUSCULOSKELETAL:  Laterality: left Knee Exam  - Alignment: Neutral  - ROM: 0 to 120 degrees and nonpainful.  Mild discomfort with passive stretch of her hamstrings and extension  - Effusion: 0  - Strength: knee extension and flexion 5/5, EHL/PF/DF motor intact  - Palpation: Nontender along knee  - Stability: Anterior/Posterior stable, varus/valgus stable  - Gait: normal  - Hip Exam: " flexion to 100+ degrees, full extension, internal/external rotation adequate, and no pain with log roll  - Special Tests: Negative Kushal, negative Lachman      NEUROVASCULAR:  - Neurovascular Status: sensation intact to light touch distally  - Capillary refill brisk at extremities, Bilateral dorsalis pedis pulse 2+     IMAGING:  Multiple views of the affected left knee(s) demonstrate: Mild tricompartmental arthritis with mild joint space narrowing.   X-rays were personally reviewed and interpreted by me.  Radiology reports were reviewed by me as well, if readily available at the time.        Juni Forbes DO  Attending Surgeon  Joint Replacement and Adult Reconstructive Surgery  [place], [place]

## 2024-08-20 NOTE — PROGRESS NOTES
PRIMARY CARE PHYSICIAN: Mamie Mane MD  REFERRING PROVIDER: Mamie Mane MD  [address]  [facility], [address] [place] [id number]     CONSULT ORTHOPAEDIC: Knee Evaluation        ASSESSMENT & PLAN    IMPRESSION:   1.  Left posterior knee pain  2.  Primary arthritis, left knee, mild    PLAN:   Discussed with the patient findings above.  She has minimal arthritic findings on her x-rays.  I do think some of her symptoms are due to some tightness and potentially some overuse and conditioning along with age with her hamstrings and her calf.  She is overall very active and has some home exercises.  Given that her symptoms are intermittent and not so much pain and just tightness recommended trying an augment to her home exercises with the current knee conditioning guide that we provided for her.  May potentially consider trying a corticosteroid junction in the future as her Baker's cyst found on ultrasound could potentially be from some mild arthritis in her knee but given that this does not seem to be significantly symptomatic we will continue to observe this and treat her conservatively.  Patient is agreed this plan of care.  She may continue activities as tolerated.  Will follow-up as needed.      SUBJECTIVE  CHIEF COMPLAINT:   Chief Complaint   Patient presents with    Left Knee - Pain        HPI: Olga Denis is a 83 y.o. patient. Olga Denis has had progressive problems with their left knee over the past 2 months. They do not report any constant or progressive numbness or tingling in their legs. Their symptoms are interfering with activities which include pain and stiffness in the back of the knee that sometimes radiates laterally to the lower leg. XR done today. Type 2 Diabetic    FUNCTIONAL STATUS: not limited.  AMBULATORY STATUS:  independent  PREVIOUS TREATMENTS: NSAIDS Advil with good improvement  HISTORY OF SURGERY ON AFFECTED KNEE(S): No        REVIEW OF SYSTEMS  Constitutional: See HPI for pain assessment, No significant weight loss, recent trauma  Cardiovascular: No chest pain, shortness of breath  Respiratory: No difficulty breathing, cough  Gastrointestinal: No nausea, vomiting, diarrhea, constipation  Musculoskeletal: Noted in HPI, positive for pain, restricted motion, stiffness  Integumentary: No rashes, easy bruising, redness   Neurological: no numbness or tingling in extremities, no gait disturbances   Psychiatric: No mood changes, memory changes, social issues  Heme/Lymph: no excessive swelling, easy bruising, excessive bleeding  ENT: No hearing changes  Eyes: No vision changes    Past Medical History:   Diagnosis Date    Other nonspecific abnormal finding of lung field 12/22/2021    Abnormal screening computed tomography (CT) of lung    Personal history of malignant melanoma of skin     History of malignant melanoma    Personal history of other diseases of the circulatory system     History of hypertension    Personal history of other diseases of the musculoskeletal system and connective tissue     History of osteopenia    Personal history of other endocrine, nutritional and metabolic disease     History of hyperlipidemia    Personal history of other endocrine, nutritional and metabolic disease     History of diabetes mellitus    Personal history of other specified conditions     History of dyspnea    Personal history of other specified conditions 06/25/2020    History of diarrhea        Allergies   Allergen Reactions    Ace Inhibitors Anaphylaxis    Sulfa (Sulfonamide Antibiotics) Anaphylaxis and Unknown    Amlodipine Swelling    Meloxicam Diarrhea    Olmesartan Swelling        Past Surgical History:   Procedure Laterality Date    BLADDER SURGERY  05/02/2018    Bladder Surgery    EYE SURGERY  05/02/2018    Eye Surgery    HYSTERECTOMY  05/02/2018    Hysterectomy    OTHER SURGICAL HISTORY  06/15/2022    Vascular surgical procedure     SEPTOPLASTY  05/02/2018    Septoplasty    TONSILLECTOMY  05/02/2018    Tonsillectomy        Family History   Problem Relation Name Age of Onset    Breast cancer Mother      Breast cancer Mother's Sister          Social History     Socioeconomic History    Marital status:      Spouse name: Not on file    Number of children: Not on file    Years of education: Not on file    Highest education level: Not on file   Occupational History    Not on file   Tobacco Use    Smoking status: Never    Smokeless tobacco: Never   Vaping Use    Vaping status: Never Used   Substance and Sexual Activity    Alcohol use: Never    Drug use: Never    Sexual activity: Not on file   Other Topics Concern    Not on file   Social History Narrative    Not on file     Social Determinants of Health     Financial Resource Strain: Not on file   Food Insecurity: Not on file   Transportation Needs: Not on file   Physical Activity: Not on file   Stress: Not on file   Social Connections: Not on file   Intimate Partner Violence: Not on file   Housing Stability: Not on file        CURRENT MEDICATIONS:   Current Outpatient Medications   Medication Sig Dispense Refill    atorvastatin (Lipitor) 40 mg tablet Take 1.5 tablets (60 mg) by mouth once daily. 135 tablet 3    hydroCHLOROthiazide (HYDRODiuril) 25 mg tablet Take 1 tablet (25 mg) by mouth once daily. 90 tablet 3    losartan (Cozaar) 100 mg tablet Take 1 tablet (100 mg) by mouth once daily. 90 tablet 3    metFORMIN (Glucophage) 500 mg tablet Take 1 tablet (500 mg) by mouth 2 times a day. 180 tablet 3    metoprolol succinate XL (Toprol-XL) 25 mg 24 hr tablet Take 0.5 tablets (12.5 mg) by mouth once daily. 45 tablet 3    omeprazole (PriLOSEC) 20 mg DR capsule Take 1 capsule (20 mg) by mouth once daily. 90 capsule 3     No current facility-administered medications for this visit.        OBJECTIVE    PHYSICAL EXAM      1/2/2024     1:00 PM 1/24/2024     2:07 PM 7/15/2024    12:35 PM 7/22/2024      "2:35 PM 8/19/2024     3:39 PM 8/19/2024     4:06 PM 8/20/2024    11:02 AM   Vitals   Systolic 128 124 120 119 152 130    Diastolic 78 77 72 79 81 84    Heart Rate 51 57 52 83 58     Temp  36.4 °C (97.6 °F)  36.7 °C (98.1 °F) 36.1 °C (97 °F)     Resp    16      Height (in) 1.651 m (5' 5\") 1.651 m (5' 5\") 1.651 m (5' 5\") 1.651 m (5' 5\") 1.651 m (5' 5\")  1.651 m (5' 5\")   Weight (lb) 144 149.4 150 145.2 148.8  149   BMI 23.96 kg/m2 24.86 kg/m2 24.96 kg/m2 24.16 kg/m2 24.76 kg/m2  24.79 kg/m2   BSA (m2) 1.73 m2 1.76 m2 1.77 m2 1.74 m2 1.76 m2  1.76 m2   Visit Report Report Report Report Report Report Report Report      Body mass index is 24.79 kg/m².    GENERAL: A/Ox3, NAD. Appears healthy, well nourished  PSYCHIATRIC: Mood stable, appropriate memory recall  EYES: EOM intact, no scleral icterus  CARDIAC: regular rate  LUNGS: Breathing non-labored  SKIN: no erythema, rashes, or ecchymoses     MUSCULOSKELETAL:  Laterality: left Knee Exam  - Alignment: Neutral  - ROM: 0 to 120 degrees and nonpainful.  Mild discomfort with passive stretch of her hamstrings and extension  - Effusion: 0  - Strength: knee extension and flexion 5/5, EHL/PF/DF motor intact  - Palpation: Nontender along knee  - Stability: Anterior/Posterior stable, varus/valgus stable  - Gait: normal  - Hip Exam: flexion to 100+ degrees, full extension, internal/external rotation adequate, and no pain with log roll  - Special Tests: Negative Kushal, negative Lachman      NEUROVASCULAR:  - Neurovascular Status: sensation intact to light touch distally  - Capillary refill brisk at extremities, Bilateral dorsalis pedis pulse 2+     IMAGING:  Multiple views of the affected left knee(s) demonstrate: Mild tricompartmental arthritis with mild joint space narrowing.   X-rays were personally reviewed and interpreted by me.  Radiology reports were reviewed by me as well, if readily available at the time.        Juni Forbes, DO  Attending Surgeon  Joint Replacement " and Adult Reconstructive Surgery  [place], [place]

## 2024-09-05 DIAGNOSIS — N28.1 RENAL CYST: ICD-10-CM

## 2024-09-05 DIAGNOSIS — N18.2 CKD (CHRONIC KIDNEY DISEASE) STAGE 2, GFR 60-89 ML/MIN: Primary | ICD-10-CM

## 2024-09-09 ENCOUNTER — HOSPITAL ENCOUNTER (OUTPATIENT)
Dept: RADIOLOGY | Facility: HOSPITAL | Age: 84
Discharge: HOME | End: 2024-09-09
Payer: MEDICARE

## 2024-09-09 DIAGNOSIS — N28.1 RENAL CYST: ICD-10-CM

## 2024-09-09 DIAGNOSIS — N18.2 CKD (CHRONIC KIDNEY DISEASE) STAGE 2, GFR 60-89 ML/MIN: ICD-10-CM

## 2024-09-09 PROCEDURE — 76770 US EXAM ABDO BACK WALL COMP: CPT | Performed by: STUDENT IN AN ORGANIZED HEALTH CARE EDUCATION/TRAINING PROGRAM

## 2024-09-09 PROCEDURE — 76770 US EXAM ABDO BACK WALL COMP: CPT

## 2024-09-24 ENCOUNTER — APPOINTMENT (OUTPATIENT)
Dept: PHARMACY | Facility: HOSPITAL | Age: 84
End: 2024-09-24
Payer: MEDICARE

## 2025-01-15 NOTE — PROGRESS NOTES
"Chief Complaint:   No chief complaint on file.     History Of Present Illness:    Olga Denis is a 84 y.o. female presenting for 6-month follow-up.  H/o HTN, DL, PVD (sees Dr. Mcdonough), DM2, Bradycardia.       Last seen by VAISHNAVI Ovalle in 7/2024.  She was under a lot of stress due to her  and daughter having medical issues. She has ongoing issues with chronic lower extremity edema/lymphedema but declined referral to lymphedema clinic.    Today, she reports she is still having swelling. She states she saw Dr. Mcdonough and was told this would not change if she had a procedure. Swelling has been unchanged.    The patient is doing well from cardiovascular perspective.  Denies chest pain/pressure, SOB, dyspnea on exertion, LE edema, orthopnea, PND, palpitations, LH/dizziness, presyncope, overt syncope.  Compliant with home cardiac medications as prescribed. She requests refills today. She stays active, and has a lot to do. She mentions she is stressed today with driving.    She c/o \"rawness\" to throat. She remembers having an esophageal dilation in the past - she declines referral back to specialist. She is not interested in seeing more providers since she is busy.    Review Of Systems:  The remainder of the review of systems was obtained, and was negative as pertains to the chief complaint.    CV testing and labs reviewed:    EKG today:    Labs 8/2024: K 4  BUN 20  Cr 0.83  ALT 16  AST 14  HgA1C 7.0   Lipid panel 8/2024:   HDL 43  LDL 73      EKG 1/2024:  SB HR 48, left axis deviation, LVH, inferior infarct age undetermined    Nuclear treadmill stress test 12/2023:  IMPRESSION:  1.  There is a small partially reversible perfusion defect in the distal anteroseptal and apical wall which resolves on prone imaging suggesting soft tissue attenuation. There is also a small fixed perfusion defect in the inferolateral wall. Breast attenuation noted. No reversible perfusion defects seen. There is a low " probability of  ischemia.  2. Calculated ejection fraction of 71% without segmental wall motion abnormality seen.    TTE 12/2023:  CONCLUSIONS:   1. Left ventricular systolic function is normal with a 70-75% estimated ejection fraction.    Holter 9/2023:    SB-ST HR max 134 bpm, 2+ PVC's, < 1% PACs, two episodes NSVT longest 3 beats, two patient triggers assoc with SR/SR with PAC    CT CAC Score 12/2021: Total 86  LM 29  LAD 2  LCx 2  RCA 53    Last Recorded Vitals:  There were no vitals filed for this visit.    Past Medical History:  She has a past medical history of Other nonspecific abnormal finding of lung field (12/22/2021), Personal history of malignant melanoma of skin, Personal history of other diseases of the circulatory system, Personal history of other diseases of the musculoskeletal system and connective tissue, Personal history of other endocrine, nutritional and metabolic disease, Personal history of other endocrine, nutritional and metabolic disease, Personal history of other specified conditions, and Personal history of other specified conditions (06/25/2020).    Past Surgical History:  She has a past surgical history that includes Septoplasty (05/02/2018); Tonsillectomy (05/02/2018); Bladder surgery (05/02/2018); Hysterectomy (05/02/2018); Eye surgery (05/02/2018); and Other surgical history (06/15/2022).      Social History:  She reports that she has never smoked. She has never used smokeless tobacco. She reports that she does not drink alcohol and does not use drugs.    Family History:  Family History   Problem Relation Name Age of Onset    Breast cancer Mother      Breast cancer Mother's Sister          Allergies:  Ace inhibitors, Sulfa (sulfonamide antibiotics), Amlodipine, Meloxicam, and Olmesartan    Outpatient Medications:  Current Outpatient Medications   Medication Instructions    atorvastatin (LIPITOR) 60 mg, oral, Daily RT    hydroCHLOROthiazide (HYDRODIURIL) 25 mg, oral, Daily     losartan (COZAAR) 100 mg, oral, Daily    metFORMIN (GLUCOPHAGE) 500 mg, oral, 2 times daily    metoprolol succinate XL (TOPROL-XL) 12.5 mg, oral, Daily    omeprazole (PRILOSEC) 20 mg, oral, Daily RT       Physical Exam:  Physical Exam  HENT:      Head: Normocephalic.      Nose: Nose normal.      Mouth/Throat:      Mouth: Mucous membranes are moist.   Cardiovascular:      Rate and Rhythm: Normal rate and regular rhythm.      Heart sounds: Normal heart sounds. No murmur heard.     Comments: Normal S1, S2  Pulmonary:      Effort: Pulmonary effort is normal.      Breath sounds: Normal breath sounds.   Abdominal:      Palpations: Abdomen is soft.   Musculoskeletal:         General: Normal range of motion.      Cervical back: Normal range of motion.      Comments: BL lymphedema   Skin:     General: Skin is warm and dry.   Neurological:      Mental Status: She is alert.   Psychiatric:         Mood and Affect: Mood normal.      Last Labs:  CBC -  Lab Results   Component Value Date    WBC 7.0 01/11/2024    HGB 13.4 01/11/2024    HCT 40.2 01/11/2024    MCV 89 01/11/2024     01/11/2024       CMP -  Lab Results   Component Value Date    CALCIUM 9.0 08/14/2024    PROT 5.8 (L) 08/14/2024    ALBUMIN 3.7 08/14/2024    AST 14 08/14/2024    ALT 16 08/14/2024    ALKPHOS 58 08/14/2024    BILITOT 0.4 08/14/2024       LIPID PANEL -   Lab Results   Component Value Date    CHOL 142 08/14/2024    TRIG 132 08/14/2024    HDL 43.0 08/14/2024    CHHDL 3.3 08/14/2024    LDLF 77 07/10/2023    VLDL 26 08/14/2024    NHDL 99 08/14/2024       RENAL FUNCTION PANEL -   Lab Results   Component Value Date    GLUCOSE 123 (H) 08/14/2024     08/14/2024    K 4.0 08/14/2024     08/14/2024    CO2 32 08/14/2024    ANIONGAP 8 (L) 08/14/2024    BUN 20 08/14/2024    CREATININE 0.83 08/14/2024    CALCIUM 9.0 08/14/2024    ALBUMIN 3.7 08/14/2024        Lab Results   Component Value Date    HGBA1C 7.0 (H) 08/14/2024     Assessment/Plan    Hypertension: today, /82, recheck 152/88   -continue on current dose of hydrochlorothiazide, losartan, and metoprolol.    -advised a low-sodium diet.  -instructed on BP log to bring next visit BP goal mid-130s/mid-80s     Dyslipidemia: FLP 8/2024 at goal, LDL 73    CT CAC Score 12/2021 = 86  -continue atorvastatin 40 mg daily  -advised a heart healthy diet.  -advised to have lipids repeated by PCP     Bradycardia:   HR improved with reduction of BB. Currently asymptomatic.  -continue on metoprolol succinate 12.5 mg daily.      Lymphedema of the lower extremities:   Pt declined referral to lymphedema clinic and for compression stockings in the past.   Follows with Dr. Mcdonough, vascular surgery, for PVD. Swelling still present but unchanged.   -advised a low-sodium die, leg elevation, exercise    Scribe Attestation  By signing my name below, IAshley, Scribe   attest that this documentation has been prepared under the direction and in the presence of Oswaldo Tobias MD.

## 2025-01-16 ENCOUNTER — APPOINTMENT (OUTPATIENT)
Dept: CARDIOLOGY | Facility: CLINIC | Age: 85
End: 2025-01-16
Payer: MEDICARE

## 2025-01-16 VITALS
DIASTOLIC BLOOD PRESSURE: 88 MMHG | BODY MASS INDEX: 24.53 KG/M2 | HEIGHT: 65 IN | HEART RATE: 79 BPM | SYSTOLIC BLOOD PRESSURE: 152 MMHG | WEIGHT: 147.2 LBS

## 2025-01-16 DIAGNOSIS — R00.1 SINUS BRADYCARDIA: ICD-10-CM

## 2025-01-16 DIAGNOSIS — I10 PRIMARY HYPERTENSION: ICD-10-CM

## 2025-01-16 DIAGNOSIS — E78.2 MIXED HYPERLIPIDEMIA: ICD-10-CM

## 2025-01-16 DIAGNOSIS — R07.9 CHEST PAIN, UNSPECIFIED TYPE: ICD-10-CM

## 2025-01-16 LAB
ATRIAL RATE: 79 BPM
P AXIS: 69 DEGREES
P OFFSET: 190 MS
P ONSET: 134 MS
PR INTERVAL: 178 MS
Q ONSET: 223 MS
QRS COUNT: 13 BEATS
QRS DURATION: 96 MS
QT INTERVAL: 390 MS
QTC CALCULATION(BAZETT): 447 MS
QTC FREDERICIA: 427 MS
R AXIS: -32 DEGREES
T AXIS: 50 DEGREES
T OFFSET: 418 MS
VENTRICULAR RATE: 79 BPM

## 2025-01-16 PROCEDURE — 1123F ACP DISCUSS/DSCN MKR DOCD: CPT | Performed by: INTERNAL MEDICINE

## 2025-01-16 PROCEDURE — 93010 ELECTROCARDIOGRAM REPORT: CPT | Performed by: INTERNAL MEDICINE

## 2025-01-16 PROCEDURE — 3079F DIAST BP 80-89 MM HG: CPT | Performed by: INTERNAL MEDICINE

## 2025-01-16 PROCEDURE — 1036F TOBACCO NON-USER: CPT | Performed by: INTERNAL MEDICINE

## 2025-01-16 PROCEDURE — 99214 OFFICE O/P EST MOD 30 MIN: CPT | Performed by: INTERNAL MEDICINE

## 2025-01-16 PROCEDURE — 99214 OFFICE O/P EST MOD 30 MIN: CPT | Mod: 25 | Performed by: INTERNAL MEDICINE

## 2025-01-16 PROCEDURE — 1159F MED LIST DOCD IN RCRD: CPT | Performed by: INTERNAL MEDICINE

## 2025-01-16 PROCEDURE — 3077F SYST BP >= 140 MM HG: CPT | Performed by: INTERNAL MEDICINE

## 2025-01-16 PROCEDURE — 93005 ELECTROCARDIOGRAM TRACING: CPT | Performed by: INTERNAL MEDICINE

## 2025-01-16 RX ORDER — LOSARTAN POTASSIUM 100 MG/1
100 TABLET ORAL DAILY
Qty: 90 TABLET | Refills: 3 | Status: SHIPPED | OUTPATIENT
Start: 2025-01-16 | End: 2026-01-16

## 2025-01-16 RX ORDER — ATORVASTATIN CALCIUM 40 MG/1
60 TABLET, FILM COATED ORAL
Qty: 135 TABLET | Refills: 3 | Status: SHIPPED | OUTPATIENT
Start: 2025-01-16 | End: 2026-01-16

## 2025-01-16 RX ORDER — METOPROLOL SUCCINATE 25 MG/1
12.5 TABLET, EXTENDED RELEASE ORAL DAILY
Qty: 45 TABLET | Refills: 3 | Status: SHIPPED | OUTPATIENT
Start: 2025-01-16 | End: 2026-01-16

## 2025-01-16 RX ORDER — HYDROCHLOROTHIAZIDE 25 MG/1
25 TABLET ORAL DAILY
Qty: 90 TABLET | Refills: 3 | Status: SHIPPED | OUTPATIENT
Start: 2025-01-16 | End: 2026-01-16

## 2025-01-16 NOTE — PATIENT INSTRUCTIONS
Thanks for following up in office today.    1)  You can consider seeing the lymphedema clinic for more recommendations for your lymphedema, including pneumatic compression.    2)  Please check your blood pressure and heart rate twice daily for the next 1-2 weeks then call my office in 2 weeks with the values. Please bring your home BP cuff to your next visit with us. Your blood pressure should be less than or equal to 130s/mid-80s.     3)  Please continue your cardiac medications as prescribed. I refilled your medications today.    Follow up with Naga Ovalle in 6 months  If you have any questions, please call us at [phone]

## 2025-01-22 ENCOUNTER — TELEPHONE (OUTPATIENT)
Dept: PHARMACY | Facility: HOSPITAL | Age: 85
End: 2025-01-22
Payer: MEDICARE

## 2025-01-22 NOTE — TELEPHONE ENCOUNTER
A voicemail was left for the patient regarding enrollment in the chronic kidney disease  program. The message included information about the program's resources. The patient was encouraged to call back for further details.    This is their first attempt.      Shelby Godinez, PharmD  PGY-1 Pharmacy Resident

## 2025-03-03 ENCOUNTER — APPOINTMENT (OUTPATIENT)
Dept: PRIMARY CARE | Facility: CLINIC | Age: 85
End: 2025-03-03
Payer: MEDICARE

## 2025-05-16 DIAGNOSIS — K21.9 GASTROESOPHAGEAL REFLUX DISEASE, UNSPECIFIED WHETHER ESOPHAGITIS PRESENT: ICD-10-CM

## 2025-05-16 RX ORDER — OMEPRAZOLE 20 MG/1
20 CAPSULE, DELAYED RELEASE ORAL
Qty: 90 CAPSULE | Refills: 3 | Status: SHIPPED | OUTPATIENT
Start: 2025-05-16

## 2025-07-13 ASSESSMENT — ENCOUNTER SYMPTOMS
MEMORY LOSS: 0
IRREGULAR HEARTBEAT: 0
DYSPNEA ON EXERTION: 0
ORTHOPNEA: 0
SYNCOPE: 0
RESPIRATORY NEGATIVE: 1
FOCAL WEAKNESS: 0
PALPITATIONS: 0
BLURRED VISION: 0
GASTROINTESTINAL NEGATIVE: 1
CONSTITUTIONAL NEGATIVE: 1
LIGHT-HEADEDNESS: 0
SHORTNESS OF BREATH: 0
DEPRESSION: 0
DECREASED APPETITE: 0
FALLS: 0
COUGH: 0

## 2025-07-13 NOTE — PROGRESS NOTES
Chief Complaint/Reason for Visit:   Patient is coming in today as a 6 month Cardiovascular follow up.      History Of Present Illness:    Ms. Denis is coming today as a 6-month cardiovascular follow-up.  We have followed this patient previously for hypertension, bradycardia, and dyslipidemia.  She also has a history of lymphedema but the patient declined compression stockings and referral to lymphedema clinic.    Patient comes in today feeling well.  She continues to have lower extremity edema but has seen an improvement with reducing the sodium in her diet.  She denies any chest pain, pressure, palpitations, or orthopnea.  She has been exercising routinely and is not having any significant dyspnea.  Patient reports no recent hospitalizations or ED visits.    Past Medical History:  She has a past medical history of Other nonspecific abnormal finding of lung field (12/22/2021), Personal history of malignant melanoma of skin, Personal history of other diseases of the circulatory system, Personal history of other diseases of the musculoskeletal system and connective tissue, Personal history of other endocrine, nutritional and metabolic disease, Personal history of other endocrine, nutritional and metabolic disease, Personal history of other specified conditions, and Personal history of other specified conditions (06/25/2020).    Past Surgical History:  She has a past surgical history that includes Septoplasty (05/02/2018); Tonsillectomy (05/02/2018); Bladder surgery (05/02/2018); Hysterectomy (05/02/2018); Eye surgery (05/02/2018); and Other surgical history (06/15/2022).      Social History:  She reports that she has never smoked. She has never used smokeless tobacco. She reports that she does not drink alcohol and does not use drugs.    Family History:  Family History   Problem Relation Name Age of Onset    Breast cancer Mother      Breast cancer Mother's Sister          Allergies:  Ace inhibitors, Sulfa  "(sulfonamide antibiotics), Amlodipine, Meloxicam, and Olmesartan    Medications:  Current Outpatient Medications   Medication Instructions    atorvastatin (LIPITOR) 60 mg, oral, Daily RT    hydroCHLOROthiazide (HYDRODIURIL) 25 mg, oral, Daily    losartan (COZAAR) 100 mg, oral, Daily    metFORMIN (GLUCOPHAGE) 500 mg, oral, 2 times daily    metoprolol succinate XL (TOPROL-XL) 12.5 mg, oral, Daily    omeprazole (PRILOSEC) 20 mg, oral, Daily RT       Review of Systems:  Review of Systems   Constitutional: Negative. Negative for decreased appetite and malaise/fatigue.   HENT: Negative.     Eyes:  Negative for blurred vision and visual disturbance.   Cardiovascular:  Positive for leg swelling (Legs have improved, eating less sodium). Negative for chest pain, dyspnea on exertion, irregular heartbeat, orthopnea, palpitations and syncope.   Respiratory: Negative.  Negative for cough and shortness of breath.    Musculoskeletal:  Positive for arthritis. Negative for falls.   Gastrointestinal: Negative.    Neurological:  Negative for focal weakness and light-headedness.   Psychiatric/Behavioral:  Negative for depression and memory loss.         Vitals  Visit Vitals  /82 (BP Location: Left arm, Patient Position: Sitting, BP Cuff Size: Adult)   Pulse 64   Ht 1.651 m (5' 5\")   Wt 68.5 kg (151 lb)   LMP  (LMP Unknown)   SpO2 97%   BMI 25.13 kg/m²   OB Status Postmenopausal   Smoking Status Never   BSA 1.77 m²        Physical Exam:  Physical Exam  Constitutional:       Appearance: Normal appearance.   HENT:      Head: Normocephalic.     Eyes:      Conjunctiva/sclera: Conjunctivae normal.       Cardiovascular:      Rate and Rhythm: Normal rate and regular rhythm.      Pulses: Normal pulses.      Heart sounds: S1 normal and S2 normal. No murmur heard.     No friction rub. No gallop.   Pulmonary:      Effort: Pulmonary effort is normal.      Breath sounds: Normal breath sounds.   Abdominal:      General: Bowel sounds are normal. "      Palpations: Abdomen is soft.      Tenderness: There is no abdominal tenderness.     Musculoskeletal:      Cervical back: Neck supple.      Right lower leg: Edema present.      Left lower leg: Edema present.      Comments: 1-2+     Skin:     General: Skin is warm and dry.     Neurological:      General: No focal deficit present.      Mental Status: She is alert and oriented to person, place, and time.     Psychiatric:         Attention and Perception: Attention normal.         Mood and Affect: Mood normal.           Last Labs:  CBC -  Lab Results   Component Value Date    WBC 7.0 01/11/2024    HGB 13.4 01/11/2024    HCT 40.2 01/11/2024    MCV 89 01/11/2024     01/11/2024     Lab Results   Component Value Date    GLUCOSE 123 (H) 08/14/2024    CALCIUM 9.0 08/14/2024     08/14/2024    K 4.0 08/14/2024    CO2 32 08/14/2024     08/14/2024    BUN 20 08/14/2024    CREATININE 0.83 08/14/2024      CMP -  Lab Results   Component Value Date    CALCIUM 9.0 08/14/2024    PROT 5.8 (L) 08/14/2024    ALBUMIN 3.7 08/14/2024    AST 14 08/14/2024    ALT 16 08/14/2024    ALKPHOS 58 08/14/2024    BILITOT 0.4 08/14/2024       LIPID PANEL -   Lab Results   Component Value Date    CHOL 142 08/14/2024    TRIG 132 08/14/2024    HDL 43.0 08/14/2024    CHHDL 3.3 08/14/2024    LDLF 77 07/10/2023    VLDL 26 08/14/2024    NHDL 99 08/14/2024       Lab Results   Component Value Date    HGBA1C 7.0 (H) 08/14/2024       Last Cardiology Tests:    Echo:12-5-23  CONCLUSIONS:   1. Left ventricular systolic function is normal with a 70-75% estimated ejection fraction.     Stress Test:12-5-23  Summary:   1. Adequate level of stress achieved.   2. No clinical or electrocardiographic evidence for ischemia at maximal workload.   3. Correlate with myocardial perfusion imaging results.   4. Nuclear image results are reported separately.     Impression   1.  There is a small partially reversible perfusion defect in the  distal anteroseptal  and apical wall which resolves on prone imaging  suggesting soft tissue attenuation. There is also a small fixed  perfusion defect in the inferolateral wall. Breast attenuation noted.  No reversible perfusion defects seen. There is a low probability of  ischemia.      2. Calculated ejection fraction of 71% without segmental wall motion  abnormality seen.       Lab review: I have personally reviewed the laboratory result(s)     Assessment/Plan:  Hypertension: Blood pressure today shows good control at 122/82.  Continue on hydrochlorothiazide 25 mg daily, losartan 100 mg daily, and metoprolol succinate 12.5 mg daily.  August, 2024 labs showed  BUN 20, creatinine 0.83, potassium 4.0.  She has lab work through her PCP office due this summer.    Bradycardia: Heart rate today is in the 60s.  She will continue on her low-dose metoprolol succinate 12.5 mg daily.    Dyslipidemia: Lipid labs from August showed reasonable control, LDL 73, triglycerides 132.  I ordered repeat lipid labs to be done this summer.  Patient will continue on atorvastatin 40 mg nightly.    Lymphedema: I discussed the importance of a low-sodium diet.  Patient declined compression stockings and also declined lymphedema clinic.  She has seen some improvement with reduction of sodium in her diet.  She has no signs of heart failure.    Patient was reminded of fasting lab work due this summer.  She will follow-up with Dr. Tobias in 6 months.  Patient instructed to call with any cardiovascular complaints. All questions were answered.       Dragon dictation was utilized to create this document. Quite often unanticipated grammatical, syntax,  and other interpretive errors are inadvertently transcribed by the computer software.  Please disregard these errors.  Please excuse any errors that have escaped final proofreading.          Aishwarya Ovalle, APRN-CNP

## 2025-07-17 ENCOUNTER — OFFICE VISIT (OUTPATIENT)
Dept: CARDIOLOGY | Facility: HOSPITAL | Age: 85
End: 2025-07-17
Payer: MEDICARE

## 2025-07-17 VITALS
HEIGHT: 65 IN | DIASTOLIC BLOOD PRESSURE: 82 MMHG | WEIGHT: 151 LBS | SYSTOLIC BLOOD PRESSURE: 122 MMHG | BODY MASS INDEX: 25.16 KG/M2 | OXYGEN SATURATION: 97 % | HEART RATE: 64 BPM

## 2025-07-17 DIAGNOSIS — R00.1 SINUS BRADYCARDIA: ICD-10-CM

## 2025-07-17 DIAGNOSIS — E78.2 MIXED HYPERLIPIDEMIA: ICD-10-CM

## 2025-07-17 DIAGNOSIS — R07.9 CHEST PAIN, UNSPECIFIED TYPE: ICD-10-CM

## 2025-07-17 DIAGNOSIS — I10 PRIMARY HYPERTENSION: ICD-10-CM

## 2025-07-17 DIAGNOSIS — I89.0 LYMPHEDEMA, NOT ELSEWHERE CLASSIFIED: Primary | ICD-10-CM

## 2025-07-17 PROCEDURE — 1036F TOBACCO NON-USER: CPT | Performed by: NURSE PRACTITIONER

## 2025-07-17 PROCEDURE — 1159F MED LIST DOCD IN RCRD: CPT | Performed by: NURSE PRACTITIONER

## 2025-07-17 PROCEDURE — 3079F DIAST BP 80-89 MM HG: CPT | Performed by: NURSE PRACTITIONER

## 2025-07-17 PROCEDURE — 1160F RVW MEDS BY RX/DR IN RCRD: CPT | Performed by: NURSE PRACTITIONER

## 2025-07-17 PROCEDURE — 99212 OFFICE O/P EST SF 10 MIN: CPT | Performed by: NURSE PRACTITIONER

## 2025-07-17 PROCEDURE — 3074F SYST BP LT 130 MM HG: CPT | Performed by: NURSE PRACTITIONER

## 2025-07-17 PROCEDURE — 99214 OFFICE O/P EST MOD 30 MIN: CPT | Performed by: NURSE PRACTITIONER

## 2025-07-17 NOTE — PATIENT INSTRUCTIONS
Continue on current meds  Heart healthy, low sodium diet  Mediterranean diet is recommended  Fasting lipid panel due  Follow up with Dr Tobias in 6 months

## 2025-07-25 DIAGNOSIS — E78.2 MIXED HYPERLIPIDEMIA: Primary | ICD-10-CM

## 2025-07-25 DIAGNOSIS — I10 PRIMARY HYPERTENSION: ICD-10-CM

## 2025-07-25 DIAGNOSIS — E11.29 TYPE 2 DIABETES MELLITUS WITH DIABETIC MICROALBUMINURIA, WITHOUT LONG-TERM CURRENT USE OF INSULIN (MULTI): ICD-10-CM

## 2025-07-25 DIAGNOSIS — R80.9 TYPE 2 DIABETES MELLITUS WITH DIABETIC MICROALBUMINURIA, WITHOUT LONG-TERM CURRENT USE OF INSULIN (MULTI): ICD-10-CM

## 2025-08-07 LAB
ALBUMIN SERPL-MCNC: 3.9 G/DL (ref 3.6–5.1)
ALBUMIN/CREAT UR: 30 MG/G CREAT
ALP SERPL-CCNC: 61 U/L (ref 37–153)
ALT SERPL-CCNC: 16 U/L (ref 6–29)
ANION GAP SERPL CALCULATED.4IONS-SCNC: 9 MMOL/L (CALC) (ref 7–17)
AST SERPL-CCNC: 17 U/L (ref 10–35)
BILIRUB SERPL-MCNC: 0.4 MG/DL (ref 0.2–1.2)
BUN SERPL-MCNC: 23 MG/DL (ref 7–25)
CALCIUM SERPL-MCNC: 9.9 MG/DL (ref 8.6–10.4)
CHLORIDE SERPL-SCNC: 98 MMOL/L (ref 98–110)
CHOLEST SERPL-MCNC: 143 MG/DL
CHOLEST/HDLC SERPL: 3.3 (CALC)
CO2 SERPL-SCNC: 29 MMOL/L (ref 20–32)
CREAT SERPL-MCNC: 0.86 MG/DL (ref 0.6–0.95)
CREAT UR-MCNC: 46 MG/DL (ref 20–275)
EGFRCR SERPLBLD CKD-EPI 2021: 67 ML/MIN/1.73M2
ERYTHROCYTE [DISTWIDTH] IN BLOOD BY AUTOMATED COUNT: 12 % (ref 11–15)
EST. AVERAGE GLUCOSE BLD GHB EST-MCNC: 169 MG/DL
EST. AVERAGE GLUCOSE BLD GHB EST-SCNC: 9.3 MMOL/L
GLUCOSE SERPL-MCNC: 157 MG/DL (ref 65–99)
HBA1C MFR BLD: 7.5 %
HCT VFR BLD AUTO: 40.1 % (ref 35–45)
HDLC SERPL-MCNC: 44 MG/DL
HGB BLD-MCNC: 13.3 G/DL (ref 11.7–15.5)
LDLC SERPL CALC-MCNC: 76 MG/DL (CALC)
LDLC SERPL DIRECT ASSAY-MCNC: 78 MG/DL
MCH RBC QN AUTO: 30.7 PG (ref 27–33)
MCHC RBC AUTO-ENTMCNC: 33.2 G/DL (ref 32–36)
MCV RBC AUTO: 92.6 FL (ref 80–100)
MICROALBUMIN UR-MCNC: 1.4 MG/DL
NONHDLC SERPL-MCNC: 99 MG/DL (CALC)
PLATELET # BLD AUTO: 292 THOUSAND/UL (ref 140–400)
PMV BLD REES-ECKER: 10.9 FL (ref 7.5–12.5)
POTASSIUM SERPL-SCNC: 4.1 MMOL/L (ref 3.5–5.3)
PROT SERPL-MCNC: 6.2 G/DL (ref 6.1–8.1)
RBC # BLD AUTO: 4.33 MILLION/UL (ref 3.8–5.1)
SODIUM SERPL-SCNC: 136 MMOL/L (ref 135–146)
TRIGL SERPL-MCNC: 150 MG/DL
TSH SERPL-ACNC: 0.96 MIU/L (ref 0.4–4.5)
VIT B12 SERPL-MCNC: 483 PG/ML (ref 200–1100)
WBC # BLD AUTO: 7.8 THOUSAND/UL (ref 3.8–10.8)

## 2025-08-07 NOTE — ASSESSMENT & PLAN NOTE
A1c up to 7.5  Mild microalbuminuria   Start SGLT-2  Risks, benefits and side effects reviewed with patient - pharm referral done  On statin, ARB  Work on diet reviewed with patient.   Recheck 6 months

## 2025-08-07 NOTE — ASSESSMENT & PLAN NOTE
Medical Wellness exam done.  DEXA ordered  Boostrix 7/23  Pneumovax 23 12/07  Prevnar 13 3/15  Prevnar 20 given - Risks, benefits and side effects reviewed with patient.   Nonsmoker  Not on opioids  Depression Screening  Depression screening completed using the PHQ-2 questions, with results documented in the chart/encounter (~5min).  (See Rooming Screening section for documentation, and/or progress note for additional information)

## 2025-08-07 NOTE — PATIENT INSTRUCTIONS
I recommend RSV vaccine and new COVID booster at the pharmacy.    I would like you to follow up in 6 months  Please have all labs that were ordered done at least 1 week prior to your visit.

## 2025-08-07 NOTE — ASSESSMENT & PLAN NOTE
GFR stable   Mild microalbuminuria   Consider SGLT-2 - clinical pharm referral done  On ARB  Low salt diet  Recheck 6 months

## 2025-08-08 ENCOUNTER — APPOINTMENT (OUTPATIENT)
Dept: PRIMARY CARE | Facility: CLINIC | Age: 85
End: 2025-08-08
Payer: MEDICARE

## 2025-08-08 VITALS
WEIGHT: 150.57 LBS | SYSTOLIC BLOOD PRESSURE: 132 MMHG | DIASTOLIC BLOOD PRESSURE: 84 MMHG | HEART RATE: 54 BPM | HEIGHT: 65 IN | TEMPERATURE: 96.6 F | BODY MASS INDEX: 25.09 KG/M2 | OXYGEN SATURATION: 98 %

## 2025-08-08 DIAGNOSIS — K21.9 GASTROESOPHAGEAL REFLUX DISEASE, UNSPECIFIED WHETHER ESOPHAGITIS PRESENT: ICD-10-CM

## 2025-08-08 DIAGNOSIS — E78.2 MIXED HYPERLIPIDEMIA: ICD-10-CM

## 2025-08-08 DIAGNOSIS — Z00.00 MEDICARE ANNUAL WELLNESS VISIT, SUBSEQUENT: Primary | ICD-10-CM

## 2025-08-08 DIAGNOSIS — N18.2 CKD (CHRONIC KIDNEY DISEASE) STAGE 2, GFR 60-89 ML/MIN: ICD-10-CM

## 2025-08-08 DIAGNOSIS — I10 PRIMARY HYPERTENSION: ICD-10-CM

## 2025-08-08 DIAGNOSIS — K21.9 GASTROESOPHAGEAL REFLUX DISEASE WITHOUT ESOPHAGITIS: ICD-10-CM

## 2025-08-08 DIAGNOSIS — Z78.0 ASYMPTOMATIC MENOPAUSE: ICD-10-CM

## 2025-08-08 DIAGNOSIS — E11.69 TYPE 2 DIABETES MELLITUS WITH OTHER SPECIFIED COMPLICATION, UNSPECIFIED WHETHER LONG TERM INSULIN USE (MULTI): ICD-10-CM

## 2025-08-08 DIAGNOSIS — E11.29 TYPE 2 DIABETES MELLITUS WITH DIABETIC MICROALBUMINURIA, WITHOUT LONG-TERM CURRENT USE OF INSULIN (MULTI): ICD-10-CM

## 2025-08-08 DIAGNOSIS — R80.9 TYPE 2 DIABETES MELLITUS WITH DIABETIC MICROALBUMINURIA, WITHOUT LONG-TERM CURRENT USE OF INSULIN (MULTI): ICD-10-CM

## 2025-08-08 DIAGNOSIS — R07.9 CHEST PAIN, UNSPECIFIED TYPE: ICD-10-CM

## 2025-08-08 DIAGNOSIS — I73.9 PERIPHERAL ARTERIAL DISEASE: ICD-10-CM

## 2025-08-08 RX ORDER — METOPROLOL SUCCINATE 25 MG/1
12.5 TABLET, EXTENDED RELEASE ORAL DAILY
Qty: 45 TABLET | Refills: 3 | Status: SHIPPED | OUTPATIENT
Start: 2025-08-08 | End: 2026-08-08

## 2025-08-08 RX ORDER — OMEPRAZOLE 20 MG/1
20 CAPSULE, DELAYED RELEASE ORAL
Qty: 90 CAPSULE | Refills: 3 | Status: SHIPPED | OUTPATIENT
Start: 2025-08-08 | End: 2026-08-08

## 2025-08-08 RX ORDER — LOSARTAN POTASSIUM 100 MG/1
100 TABLET ORAL DAILY
Qty: 90 TABLET | Refills: 3 | Status: SHIPPED | OUTPATIENT
Start: 2025-08-08 | End: 2026-08-08

## 2025-08-08 RX ORDER — METFORMIN HYDROCHLORIDE 500 MG/1
500 TABLET ORAL 2 TIMES DAILY
Qty: 180 TABLET | Refills: 3 | Status: SHIPPED | OUTPATIENT
Start: 2025-08-08 | End: 2026-08-08

## 2025-08-08 RX ORDER — HYDROCHLOROTHIAZIDE 25 MG/1
25 TABLET ORAL DAILY
Qty: 90 TABLET | Refills: 3 | Status: SHIPPED | OUTPATIENT
Start: 2025-08-08 | End: 2026-08-08

## 2025-08-08 RX ORDER — ATORVASTATIN CALCIUM 40 MG/1
60 TABLET, FILM COATED ORAL
Qty: 135 TABLET | Refills: 3 | Status: SHIPPED | OUTPATIENT
Start: 2025-08-08 | End: 2026-08-08

## 2025-08-08 ASSESSMENT — ACTIVITIES OF DAILY LIVING (ADL)
TAKING_MEDICATION: INDEPENDENT
GROCERY_SHOPPING: INDEPENDENT
DRESSING: INDEPENDENT
BATHING: INDEPENDENT
DOING_HOUSEWORK: INDEPENDENT
MANAGING_FINANCES: INDEPENDENT

## 2025-08-08 ASSESSMENT — PATIENT HEALTH QUESTIONNAIRE - PHQ9
SUM OF ALL RESPONSES TO PHQ9 QUESTIONS 1 AND 2: 0
1. LITTLE INTEREST OR PLEASURE IN DOING THINGS: NOT AT ALL
2. FEELING DOWN, DEPRESSED OR HOPELESS: NOT AT ALL

## 2025-08-08 ASSESSMENT — ENCOUNTER SYMPTOMS
OCCASIONAL FEELINGS OF UNSTEADINESS: 1
DEPRESSION: 0

## 2025-08-15 ENCOUNTER — TELEMEDICINE (OUTPATIENT)
Dept: PHARMACY | Facility: HOSPITAL | Age: 85
End: 2025-08-15
Payer: MEDICARE

## 2025-08-15 DIAGNOSIS — E11.29 TYPE 2 DIABETES MELLITUS WITH DIABETIC MICROALBUMINURIA, WITHOUT LONG-TERM CURRENT USE OF INSULIN (MULTI): ICD-10-CM

## 2025-08-15 DIAGNOSIS — N18.2 CKD (CHRONIC KIDNEY DISEASE) STAGE 2, GFR 60-89 ML/MIN: ICD-10-CM

## 2025-08-15 DIAGNOSIS — R80.9 TYPE 2 DIABETES MELLITUS WITH DIABETIC MICROALBUMINURIA, WITHOUT LONG-TERM CURRENT USE OF INSULIN (MULTI): ICD-10-CM

## 2026-02-09 ENCOUNTER — APPOINTMENT (OUTPATIENT)
Dept: PRIMARY CARE | Facility: CLINIC | Age: 86
End: 2026-02-09
Payer: MEDICARE